# Patient Record
Sex: FEMALE | Race: ASIAN | NOT HISPANIC OR LATINO | Employment: OTHER | URBAN - METROPOLITAN AREA
[De-identification: names, ages, dates, MRNs, and addresses within clinical notes are randomized per-mention and may not be internally consistent; named-entity substitution may affect disease eponyms.]

---

## 2022-08-17 ENCOUNTER — TELEPHONE (OUTPATIENT)
Dept: SCHEDULING | Facility: IMAGING CENTER | Age: 72
End: 2022-08-17

## 2022-08-17 ENCOUNTER — TELEMEDICINE (OUTPATIENT)
Dept: TELEHEALTH | Facility: TELEMEDICINE | Age: 72
End: 2022-08-17
Payer: MEDICARE

## 2022-08-17 VITALS — WEIGHT: 140 LBS | HEIGHT: 63 IN | BODY MASS INDEX: 24.8 KG/M2

## 2022-08-17 DIAGNOSIS — U07.1 COVID: ICD-10-CM

## 2022-08-17 PROCEDURE — 99203 OFFICE O/P NEW LOW 30 MIN: CPT | Mod: 95 | Performed by: NURSE PRACTITIONER

## 2022-08-17 ASSESSMENT — ENCOUNTER SYMPTOMS
NEUROLOGICAL NEGATIVE: 1
GASTROINTESTINAL NEGATIVE: 1
CARDIOVASCULAR NEGATIVE: 1
WHEEZING: 0
SORE THROAT: 1
MUSCULOSKELETAL NEGATIVE: 1
FEVER: 1
SHORTNESS OF BREATH: 0
COUGH: 1

## 2022-08-17 NOTE — PROGRESS NOTES
Edwina Khalil is a 72 y.o. female who presents with Coronavirus Screening (Tested positive last night. 72 yrs old with hypertension and asthma. )           This evaluation was conducted via Zoom using secure and encrypted videoconferencing technology. The patient was in their home in the state [facility].    The patient's identity was confirmed and verbal consent was obtained for this virtual visit.     HPI  Daughter present with patient during telemedicine visit.  States mother tested COVID-positive last night with at home test.  States symptoms started yesterday: Cough fever sore throat.  Eating and drinking well.  History of hypertension, dyslipidemia, asthma.  Does not have current primary care provider but does have new appointment in October to establish with 1.  Taking over-the-counter Tylenol Robitussin-DM and Mucinex lozenges.  Daughter states no allergies to medications.  Does take losartan 25 mg, baby aspirin 81 mg and albuterol as needed.  She also takes a statin for her dyslipidemia.  Daughter states no abnormal kidney function.  Daughter is COVID-positive as well and is currently taking Paxil over the.  Daughter would like to start Paxilovid would with her mother.  States will withhold statin with Paxlovid use.     PMH:  has no past medical history on file.  MEDS:   Current Outpatient Medications:     Nirmatrelvir&Ritonavir 300/100 20 x 150 MG & 10 x 100MG Tablet Therapy Pack, Take 300 mg nirmatrelvir (two 150 mg tablets) with 100 mg ritonavir (one 100 mg tablet) by mouth, with all three tablets taken together twice daily for 5 days., Disp: 30 Each, Rfl: 0  ALLERGIES: Not on File  SURGHX: No past surgical history on file.  SOCHX:    FH: Family history was reviewed, no pertinent findings to report    Review of Systems   Constitutional:  Positive for fever and malaise/fatigue.   HENT:  Positive for sore throat.    Respiratory:  Positive for cough. Negative for shortness of breath and wheezing.   "  Cardiovascular: Negative.    Gastrointestinal: Negative.    Musculoskeletal: Negative.    Neurological: Negative.    All other systems reviewed and are negative.           Objective     Ht 1.6 m (5' 3\")   Wt 63.5 kg (140 lb)   BMI 24.80 kg/m²      Physical Exam  Constitutional:       General: She is awake. She is not in acute distress.     Appearance: Normal appearance. She is not ill-appearing, toxic-appearing or diaphoretic.   Pulmonary:      Effort: Pulmonary effort is normal.   Neurological:      Mental Status: She is alert.   Psychiatric:         Attention and Perception: Attention normal.         Mood and Affect: Mood normal.         Behavior: Behavior normal. Behavior is cooperative.                           Assessment & Plan        1. COVID    - Nirmatrelvir&Ritonavir 300/100 20 x 150 MG & 10 x 100MG Tablet Therapy Pack; Take 300 mg nirmatrelvir (two 150 mg tablets) with 100 mg  ritonavir (one 100 mg tablet) by mouth, with all three tablets taken together  twice daily for 5 days.  Dispense: 30 Each; Refill: 0  -Patient to withhold statin use while on antiviral as well as 5 additional days, daughter is aware of this  -Stay home isolated from others until COVID test resulted the follow CDC guidelines for positive cases as discussed  -Increase water intake  -May use over the counter Tylenol/Ibuprofen as needed for fever or body aches  -Get rest  -Salt water gargle as needed for any sore throat  -May use over the counter Flonase, saline nasal spray as needed for any nasal congestion  -May use over the counter cough suppressant medications like plain Robitussin/Delsym as needed   -May take over the counter Imodium as needed for diarrhea  -Monitor for fevers, cough, shortness of breath, chest pain, chest tightness, lethargy- need re-evaluation in clinic                  "

## 2022-10-10 ENCOUNTER — OFFICE VISIT (OUTPATIENT)
Dept: URGENT CARE | Facility: CLINIC | Age: 72
End: 2022-10-10
Payer: MEDICARE

## 2022-10-10 ENCOUNTER — APPOINTMENT (OUTPATIENT)
Dept: RADIOLOGY | Facility: IMAGING CENTER | Age: 72
End: 2022-10-10
Attending: PHYSICIAN ASSISTANT
Payer: MEDICARE

## 2022-10-10 VITALS
TEMPERATURE: 98.7 F | SYSTOLIC BLOOD PRESSURE: 118 MMHG | BODY MASS INDEX: 25.76 KG/M2 | RESPIRATION RATE: 16 BRPM | HEART RATE: 84 BPM | WEIGHT: 140 LBS | OXYGEN SATURATION: 98 % | HEIGHT: 62 IN | DIASTOLIC BLOOD PRESSURE: 72 MMHG
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
## 2022-10-10 DIAGNOSIS — M25.532 ACUTE PAIN OF LEFT WRIST: ICD-10-CM

## 2022-10-10 PROCEDURE — 73110 X-RAY EXAM OF WRIST: CPT | Mod: TC,FY,LT | Performed by: PHYSICIAN ASSISTANT

## 2022-10-10 PROCEDURE — 99203 OFFICE O/P NEW LOW 30 MIN: CPT | Performed by: PHYSICIAN ASSISTANT

## 2022-10-10 NOTE — PROGRESS NOTES
"Subjective:   Isha Kahlil is a 72 y.o. female who presents for Wrist Injury (Yesterday, left wrist pain after fall, swollen and bruised )     This is a pleasant 72-year-old female accompanied by her daughter who is translating for her today.  She reports a trip and fall on a stair yesterday whereupon when she landed on her left hand.  She is complaining of left wrist and hand pain.  She has tried some ice.  She denies numbness or tingling.  She reports painful range of motion particularly on the ulnar side.  Does have a history of osteopenia.  No prior injury to the left wrist however.        Medications:  Nirmatrelvir&Ritonavir 300/100 Tbpk    Allergies:             Patient has no known allergies.    Surgical History:       No past surgical history on file.    Past Social Hx:  Isha Khalil  reports that she has never smoked. She has never used smokeless tobacco. She reports that she does not drink alcohol.     Past Family Hx:   Isha Khalil family history is not on file.       Problem list, medications, and allergies reviewed by myself today in Epic.     Objective:     /72   Pulse 84   Temp 37.1 °C (98.7 °F) (Temporal)   Resp 16   Ht 1.575 m (5' 2\")   Wt 63.5 kg (140 lb)   SpO2 98%   BMI 25.61 kg/m²     Physical Exam  Musculoskeletal:        Hands:       Comments: There is tenderness to palpation to the distal radius, but greater to the ulnar region and base of the triquetrum and pisiform as well as the ulnar styloid process.  There is decreased range of motion and pain with ulnar and radial deviation.  Neurovascularly intact.  Decreased wrist flexion extension.  Sensation intact.       RADIOLOGY RESULTS   DX-HAND 3+ LEFT    Result Date: 10/10/2022  10/10/2022 8:48 AM HISTORY/REASON FOR EXAM:  Pain/Deformity Following Trauma. TECHNIQUE/EXAM DESCRIPTION AND NUMBER OF VIEWS:  3 views of the LEFT hand. COMPARISON: None FINDINGS: There is no fracture or dislocation. The visualized osseous structures are in anatomic " alignment. Scattered degenerative changes, moderate involving the fifth DIP joint, more mild elsewhere. Bone mineralization is decreased..     1.  No acute osseous abnormality. 2.  Mild to moderate degenerative changes.    DX-WRIST-COMPLETE 3+ LEFT    Result Date: 10/10/2022  10/10/2022 8:48 AM HISTORY/REASON FOR EXAM:  Pain/Deformity Following Trauma. Pain after fall TECHNIQUE/EXAM DESCRIPTION AND NUMBER OF VIEWS:  4 views of the LEFT wrist. COMPARISON: None FINDINGS: There is no fracture or dislocation. The visualized osseous structures are in anatomic alignment. There are degenerative changes of the first carpometacarpal joint. Bone mineralization is decreased.. Soft tissue swelling about the wrist.     1.  Soft tissue swelling without acute osseous abnormality. 2.  Mild-to-moderate degenerative changes of the first carpometacarpal joint.         *X-rays were reviewed and interpreted independently by me. I agree with the radiologist's findings       Assessment/Plan:     Diagnosis and Associated Orders:     1. Acute pain of left wrist  - DX-WRIST-COMPLETE 3+ LEFT; Future  - DX-HAND 3+ LEFT; Future  - Referral to Orthopedics    2. Fall from ground level  - Referral to Orthopedics      Comments/MDM:    No acute radiographic bony abnormality.  Extensive osteoarthritic changes.  Soft tissue swelling present.  Will place in wrist immobilizer and recommend follow-up with orthopedics in 7 to 10 days time for reevaluation and possible repeat imaging.  Recommend ice elevation and OTC analgesics as needed.  Neurovascular intact.  Return precautions discussed.  All questions answered.    I personally reviewed prior external notes and test results pertinent to today's visit.  Red flags discussed as well as indications to present to the Emergency Department.  Supportive care, natural history, differential diagnoses, and indications for immediate follow-up discussed.  Patient expresses understanding and agrees to plan.  Patient  denies any other questions or concerns.    Follow-up with the primary care physician for recheck, reevaluation, and consideration of further management.      Please note that this dictation was created using voice recognition software. I have made a reasonable attempt to correct obvious errors, but I expect that there are errors of grammar and possibly content that I did not discover before finalizing the note.    This note was electronically signed by Faiza Wharton PA-C

## 2022-10-14 ENCOUNTER — OFFICE VISIT (OUTPATIENT)
Dept: MEDICAL GROUP | Facility: MEDICAL CENTER | Age: 72
End: 2022-10-14
Payer: MEDICARE

## 2022-10-14 VITALS
BODY MASS INDEX: 25.15 KG/M2 | WEIGHT: 136.69 LBS | SYSTOLIC BLOOD PRESSURE: 120 MMHG | OXYGEN SATURATION: 98 % | TEMPERATURE: 97.9 F | RESPIRATION RATE: 14 BRPM | HEIGHT: 62 IN | DIASTOLIC BLOOD PRESSURE: 74 MMHG | HEART RATE: 88 BPM

## 2022-10-14 DIAGNOSIS — Z86.711 HISTORY OF PULMONARY EMBOLUS (PE): ICD-10-CM
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
## 2022-10-14 DIAGNOSIS — I10 BENIGN ESSENTIAL HTN: ICD-10-CM

## 2022-10-14 DIAGNOSIS — Z11.59 NEED FOR HEPATITIS C SCREENING TEST: ICD-10-CM

## 2022-10-14 DIAGNOSIS — E78.5 DYSLIPIDEMIA: ICD-10-CM

## 2022-10-14 DIAGNOSIS — Z87.19 HISTORY OF GI BLEED: ICD-10-CM

## 2022-10-14 DIAGNOSIS — Z86.69 HISTORY OF GLAUCOMA: ICD-10-CM

## 2022-10-14 DIAGNOSIS — H54.8 LEGAL BLINDNESS OF RIGHT EYE, AS DEFINED IN UNITED STATES OF AMERICA: ICD-10-CM

## 2022-10-14 DIAGNOSIS — H18.9 CORNEAL ABNORMALITY: ICD-10-CM

## 2022-10-14 DIAGNOSIS — J45.40 MODERATE PERSISTENT ASTHMA WITHOUT COMPLICATION: ICD-10-CM

## 2022-10-14 DIAGNOSIS — E04.2 MULTINODULAR GOITER: ICD-10-CM

## 2022-10-14 DIAGNOSIS — E03.8 HYPOTHYROIDISM DUE TO HASHIMOTO'S THYROIDITIS: ICD-10-CM

## 2022-10-14 DIAGNOSIS — H25.89 OTHER AGE-RELATED CATARACT OF LEFT EYE: ICD-10-CM

## 2022-10-14 DIAGNOSIS — M81.0 AGE-RELATED OSTEOPOROSIS WITHOUT CURRENT PATHOLOGICAL FRACTURE: ICD-10-CM

## 2022-10-14 DIAGNOSIS — E06.3 HYPOTHYROIDISM DUE TO HASHIMOTO'S THYROIDITIS: ICD-10-CM

## 2022-10-14 DIAGNOSIS — G47.09 OTHER INSOMNIA: ICD-10-CM

## 2022-10-14 PROBLEM — E03.9 ACQUIRED HYPOTHYROIDISM: Status: ACTIVE | Noted: 2022-10-14

## 2022-10-14 PROBLEM — E04.1 THYROID NODULE: Status: ACTIVE | Noted: 2022-10-14

## 2022-10-14 PROCEDURE — 99214 OFFICE O/P EST MOD 30 MIN: CPT | Performed by: INTERNAL MEDICINE

## 2022-10-14 RX ORDER — FLUTICASONE PROPIONATE AND SALMETEROL 100; 50 UG/1; UG/1
1 POWDER RESPIRATORY (INHALATION) EVERY 12 HOURS
Qty: 180 EACH | Refills: 3 | Status: SHIPPED | OUTPATIENT
Start: 2022-10-14 | End: 2022-11-01

## 2022-10-14 RX ORDER — LOSARTAN POTASSIUM 25 MG/1
25 TABLET ORAL NIGHTLY
COMMUNITY
End: 2022-10-14 | Stop reason: SDUPTHER

## 2022-10-14 RX ORDER — LEVOTHYROXINE SODIUM 0.05 MG/1
50 TABLET ORAL
COMMUNITY
End: 2022-10-14 | Stop reason: SDUPTHER

## 2022-10-14 RX ORDER — ALPRAZOLAM 0.25 MG/1
0.25 TABLET ORAL NIGHTLY PRN
Qty: 60 TABLET | Refills: 0 | Status: SHIPPED | OUTPATIENT
Start: 2022-10-14 | End: 2022-12-13

## 2022-10-14 RX ORDER — AMLODIPINE BESYLATE 10 MG/1
10 TABLET ORAL DAILY
Qty: 90 TABLET | Refills: 3 | Status: SHIPPED | OUTPATIENT
Start: 2022-10-14 | End: 2024-03-13

## 2022-10-14 RX ORDER — LOSARTAN POTASSIUM 25 MG/1
25 TABLET ORAL NIGHTLY
Qty: 90 TABLET | Refills: 3 | Status: SHIPPED | OUTPATIENT
Start: 2022-10-14 | End: 2023-10-17

## 2022-10-14 RX ORDER — LEVOTHYROXINE SODIUM 0.05 MG/1
50 TABLET ORAL
Qty: 90 TABLET | Refills: 3 | Status: SHIPPED | OUTPATIENT
Start: 2022-10-14 | End: 2023-10-17

## 2022-10-14 RX ORDER — ROSUVASTATIN CALCIUM 5 MG/1
5 TABLET, COATED ORAL EVERY EVENING
COMMUNITY
End: 2022-10-14 | Stop reason: SDUPTHER

## 2022-10-14 RX ORDER — ROSUVASTATIN CALCIUM 5 MG/1
5 TABLET, COATED ORAL EVERY EVENING
Qty: 90 TABLET | Refills: 3 | Status: SHIPPED | OUTPATIENT
Start: 2022-10-14 | End: 2023-10-24

## 2022-10-14 RX ORDER — ASPIRIN 81 MG/1
81 TABLET ORAL DAILY
COMMUNITY

## 2022-10-14 ASSESSMENT — PATIENT HEALTH QUESTIONNAIRE - PHQ9: CLINICAL INTERPRETATION OF PHQ2 SCORE: 0

## 2022-10-14 NOTE — PROGRESS NOTES
New Patient to Establish      CC: Establish, review of chronic medical problems    HPI:   Isha is a 72 y.o. female who came into clinic for above.    She does not have any acute complaint.  She is from [facility].  She currently lives with her daughter who is a primary care physician in [place].    She did fell recently, tripped over something, and sustained a sprain on her left hand.  Initial x-ray did not show fracture.    Her medical problems are stable and well-controlled.  She does need follow-up and new blood tests.      ROS:   As above    Patient Active Problem List    Diagnosis Date Noted    Dyslipidemia 10/14/2022    Hypothyroidism due to Hashimoto's thyroiditis 10/14/2022    Benign essential HTN 10/14/2022    Moderate persistent asthma without complication 10/14/2022    History of pulmonary embolus (PE) 10/14/2022    Other insomnia 10/14/2022    Multinodular goiter sp hemithyroidectomy 10/14/2022    Age-related osteoporosis without current pathological fracture 10/14/2022    History of GI bleed from erosion 10/14/2022    Corneal abnormality, left 10/14/2022    Legal blindness of right eye, as defined in [place] 10/14/2022    History of glaucoma, right 10/14/2022    Other age-related cataract, left 10/14/2022       History reviewed. No pertinent past medical history.    Current Outpatient Medications   Medication Sig Dispense Refill    aspirin 81 MG EC tablet Take 81 mg by mouth every day.      amLODIPine (NORVASC) 10 MG Tab Take 1 Tablet by mouth every day. 90 Tablet 3    fluticasone-salmeterol (ADVAIR DISKUS) 100-50 MCG/ACT AEROSOL POWDER, BREATH ACTIVATED Inhale 1 Puff every 12 hours. 180 Each 3    Metoprolol Succinate 25 MG Capsule ER 24 Hour Sprinkle Take 25 mg by mouth every day. 90 Capsule 3    ALPRAZolam (XANAX) 0.25 MG Tab Take 1 Tablet by mouth at bedtime as needed for Sleep for up to 60 days. 60 Tablet 0    rosuvastatin (CRESTOR) 5 MG Tab Take 1 Tablet by mouth every evening. 90 Tablet 3  "   losartan (COZAAR) 25 MG Tab Take 1 Tablet by mouth every evening. 90 Tablet 3    levothyroxine (SYNTHROID) 50 MCG Tab Take 1 Tablet by mouth every morning on an empty stomach. 90 Tablet 3     No current facility-administered medications for this visit.       Allergies as of 10/14/2022    (No Known Allergies)       Social History     Socioeconomic History    Marital status:      Spouse name: Not on file    Number of children: Not on file    Years of education: Not on file    Highest education level: Not on file   Occupational History    Not on file   Tobacco Use    Smoking status: Never    Smokeless tobacco: Never   Vaping Use    Vaping Use: Never used   Substance and Sexual Activity    Alcohol use: Never    Drug use: Never    Sexual activity: Not on file     Comment: , 4 kids   Other Topics Concern    Not on file   Social History Narrative    Not on file     Social Determinants of Health     Financial Resource Strain: Not on file   Food Insecurity: Not on file   Transportation Needs: Not on file   Physical Activity: Not on file   Stress: Not on file   Social Connections: Not on file   Intimate Partner Violence: Not on file   Housing Stability: Not on file       Family History   Problem Relation Age of Onset    No Known Problems Mother     No Known Problems Father        Past Surgical History:   Procedure Laterality Date    OTHER  2016    hemithyroidectomy, left    APPENDECTOMY  2008    OTHER      anal fissure repair         /74 (BP Location: Left arm, Patient Position: Sitting, BP Cuff Size: Small adult)   Pulse 88   Temp 36.6 °C (97.9 °F) (Temporal)   Resp 14   Ht 1.575 m (5' 2\")   Wt 62 kg (136 lb 11 oz)   SpO2 98%   BMI 25.00 kg/m²     Physical Exam  General: Alert and oriented, No apparent distress.  No scleral icterus.  Neck: Supple. No cervical or supraclavicular lymphadenopathy noted. Thyroid not enlarged.  Lungs: Clear to auscultation bilaterally without any wheezing, " crepitations.  Cardiovascular: Regular rate and rhythm. No murmurs, rubs or gallops.  Abdomen: Bowel sound +, soft, non tender, no rebound or guarding, no palpable organomegaly  Extremities: No clubbing, cyanosis, edema.      Assessment and Plan    1. Benign essential HTN  Controlled with metoprolol XR 12.5 mg daily and cilnidipine 10 mg daily. Cilnidipine is not liver in [place], usual dose from 5 to 10 mg, maximal dose 20 mg.  Switched to amlodipine 10 mg daily.  Monitor blood pressure at home.  We do not have metoprolol  12.5 mg XR tablets either.  Consider increasing metoprolol to 25 mg daily and cutting down amlodipine to 5 mg daily depending on her blood pressure.  - amLODIPine (NORVASC) 10 MG Tab; Take 1 Tablet by mouth every day.  Dispense: 90 Tablet; Refill: 3  - Metoprolol Succinate 25 MG Capsule ER 24 Hour Sprinkle; Take 25 mg by mouth every day.  Dispense: 90 Capsule; Refill: 3  - losartan (COZAAR) 25 MG Tab; Take 1 Tablet by mouth every evening.  Dispense: 90 Tablet; Refill: 3  - PROTEIN/CREAT RATIO URINE; Future  - MICROALBUMIN CREAT RATIO URINE; Future    2. Dyslipidemia  Tolerating statin.  - rosuvastatin (CRESTOR) 5 MG Tab; Take 1 Tablet by mouth every evening.  Dispense: 90 Tablet; Refill: 3  - Comp Metabolic Panel; Future  - Lipid Profile; Future    3. Hypothyroidism due to Hashimoto's thyroiditis  Initially hyper, later became hypo  - levothyroxine (SYNTHROID) 50 MCG Tab; Take 1 Tablet by mouth every morning on an empty stomach.  Dispense: 90 Tablet; Refill: 3  - TSH WITH REFLEX TO FT4; Future    4. Multinodular goiter  Left hemithyroidectomy due to suspicious nodule in 2016. No f/up US sinc.e  - US-THYROID; Future    5. Moderate persistent asthma without complication  Stable.  - fluticasone-salmeterol (ADVAIR DISKUS) 100-50 MCG/ACT AEROSOL POWDER, BREATH ACTIVATED; Inhale 1 Puff every 12 hours.  Dispense: 180 Each; Refill: 3    6. History of pulmonary embolus (PE)  7. History of GI bleed  from erosion  Had a few UGI bleed from anticoagulation when she got PE. Provoked after airplane trip in 2020. Currently no recurrence. On aspirin for primary prophylaxis  - CBC WITH DIFFERENTIAL; Future    8. Other insomnia  Xanax 2-3 times per week for insomnia. Discussed the risks of long term use. Try to minimize use.  - ALPRAZolam (XANAX) 0.25 MG Tab; Take 1 Tablet by mouth at bedtime as needed for Sleep for up to 60 days.  Dispense: 60 Tablet; Refill: 0    9. Age-related osteoporosis without current pathological fracture  She was on bisphosphonate x 4 yrs, on holiday for a few yrs now.  - DS-BONE DENSITY STUDY (DEXA); Future  - VITAMIN D,25 HYDROXY (DEFICIENCY); Future    10. Corneal abnormality, left  - hypersensitivity of cornea with tearing, better with non prescription soft contact lens. Changes once a month.    11. Legal blindness of right eye, as defined in [place]    12. Other age-related cataract of left eye  - Referral to Ophthalmology    13. History of glaucoma  - Referral to Ophthalmology    14. Need for hepatitis C screening test  - HEP C VIRUS ANTIBODY; Future    15. Fall from ground level  - VITAMIN B12; Future           Followup: based on results           Signed by: Malena Khalil M.D.

## 2022-10-14 NOTE — LETTER
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

## 2022-10-22 ENCOUNTER — HOSPITAL ENCOUNTER (OUTPATIENT)
Dept: LAB | Facility: MEDICAL CENTER | Age: 72
End: 2022-10-22
Attending: INTERNAL MEDICINE
Payer: MEDICARE

## 2022-10-22 DIAGNOSIS — Z11.59 NEED FOR HEPATITIS C SCREENING TEST: ICD-10-CM

## 2022-10-22 DIAGNOSIS — E03.8 HYPOTHYROIDISM DUE TO HASHIMOTO'S THYROIDITIS: ICD-10-CM

## 2022-10-22 DIAGNOSIS — E78.5 DYSLIPIDEMIA: ICD-10-CM

## 2022-10-22 DIAGNOSIS — I10 BENIGN ESSENTIAL HTN: ICD-10-CM

## 2022-10-22 DIAGNOSIS — E06.3 HYPOTHYROIDISM DUE TO HASHIMOTO'S THYROIDITIS: ICD-10-CM
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
## 2022-10-22 DIAGNOSIS — Z87.19 HISTORY OF GI BLEED: ICD-10-CM

## 2022-10-22 DIAGNOSIS — M81.0 AGE-RELATED OSTEOPOROSIS WITHOUT CURRENT PATHOLOGICAL FRACTURE: ICD-10-CM

## 2022-10-22 LAB
25(OH)D3 SERPL-MCNC: 9 NG/ML (ref 30–100)
ALBUMIN SERPL BCP-MCNC: 4.4 G/DL (ref 3.2–4.9)
ALBUMIN/GLOB SERPL: 1.7 G/DL
ALP SERPL-CCNC: 93 U/L (ref 30–99)
ALT SERPL-CCNC: 12 U/L (ref 2–50)
ANION GAP SERPL CALC-SCNC: 9 MMOL/L (ref 7–16)
AST SERPL-CCNC: 18 U/L (ref 12–45)
BASOPHILS # BLD AUTO: 0.7 % (ref 0–1.8)
BASOPHILS # BLD: 0.03 K/UL (ref 0–0.12)
BILIRUB SERPL-MCNC: 0.7 MG/DL (ref 0.1–1.5)
BUN SERPL-MCNC: 6 MG/DL (ref 8–22)
CALCIUM SERPL-MCNC: 8.9 MG/DL (ref 8.5–10.5)
CHLORIDE SERPL-SCNC: 104 MMOL/L (ref 96–112)
CHOLEST SERPL-MCNC: 142 MG/DL (ref 100–199)
CO2 SERPL-SCNC: 26 MMOL/L (ref 20–33)
CREAT SERPL-MCNC: 0.58 MG/DL (ref 0.5–1.4)
CREAT UR-MCNC: 134.78 MG/DL
CREAT UR-MCNC: 136.27 MG/DL
EOSINOPHIL # BLD AUTO: 0.04 K/UL (ref 0–0.51)
EOSINOPHIL NFR BLD: 0.9 % (ref 0–6.9)
ERYTHROCYTE [DISTWIDTH] IN BLOOD BY AUTOMATED COUNT: 47.3 FL (ref 35.9–50)
FASTING STATUS PATIENT QL REPORTED: NORMAL
GFR SERPLBLD CREATININE-BSD FMLA CKD-EPI: 95 ML/MIN/1.73 M 2
GLOBULIN SER CALC-MCNC: 2.6 G/DL (ref 1.9–3.5)
GLUCOSE SERPL-MCNC: 101 MG/DL (ref 65–99)
HCT VFR BLD AUTO: 39.5 % (ref 37–47)
HCV AB SER QL: NORMAL
HDLC SERPL-MCNC: 57 MG/DL
HGB BLD-MCNC: 13 G/DL (ref 12–16)
IMM GRANULOCYTES # BLD AUTO: 0.02 K/UL (ref 0–0.11)
IMM GRANULOCYTES NFR BLD AUTO: 0.5 % (ref 0–0.9)
LDLC SERPL CALC-MCNC: 67 MG/DL
LYMPHOCYTES # BLD AUTO: 1.14 K/UL (ref 1–4.8)
LYMPHOCYTES NFR BLD: 25.7 % (ref 22–41)
MCH RBC QN AUTO: 30.1 PG (ref 27–33)
MCHC RBC AUTO-ENTMCNC: 32.9 G/DL (ref 33.6–35)
MCV RBC AUTO: 91.4 FL (ref 81.4–97.8)
MICROALBUMIN UR-MCNC: <1.2 MG/DL
MICROALBUMIN/CREAT UR: NORMAL MG/G (ref 0–30)
MONOCYTES # BLD AUTO: 0.41 K/UL (ref 0–0.85)
MONOCYTES NFR BLD AUTO: 9.2 % (ref 0–13.4)
NEUTROPHILS # BLD AUTO: 2.8 K/UL (ref 2–7.15)
NEUTROPHILS NFR BLD: 63 % (ref 44–72)
NRBC # BLD AUTO: 0 K/UL
NRBC BLD-RTO: 0 /100 WBC
PLATELET # BLD AUTO: 275 K/UL (ref 164–446)
PMV BLD AUTO: 9.8 FL (ref 9–12.9)
POTASSIUM SERPL-SCNC: 4 MMOL/L (ref 3.6–5.5)
PROT SERPL-MCNC: 7 G/DL (ref 6–8.2)
PROT UR-MCNC: 12 MG/DL (ref 0–15)
PROT/CREAT UR: 89 MG/G (ref 10–107)
RBC # BLD AUTO: 4.32 M/UL (ref 4.2–5.4)
SODIUM SERPL-SCNC: 139 MMOL/L (ref 135–145)
TRIGL SERPL-MCNC: 88 MG/DL (ref 0–149)
TSH SERPL DL<=0.005 MIU/L-ACNC: 4.39 UIU/ML (ref 0.38–5.33)
VIT B12 SERPL-MCNC: 407 PG/ML (ref 211–911)
WBC # BLD AUTO: 4.4 K/UL (ref 4.8–10.8)

## 2022-10-22 PROCEDURE — 82306 VITAMIN D 25 HYDROXY: CPT

## 2022-10-22 PROCEDURE — 82607 VITAMIN B-12: CPT

## 2022-10-22 PROCEDURE — 82570 ASSAY OF URINE CREATININE: CPT

## 2022-10-22 PROCEDURE — 82043 UR ALBUMIN QUANTITATIVE: CPT

## 2022-10-22 PROCEDURE — 84156 ASSAY OF PROTEIN URINE: CPT

## 2022-10-22 PROCEDURE — 80053 COMPREHEN METABOLIC PANEL: CPT

## 2022-10-22 PROCEDURE — 86803 HEPATITIS C AB TEST: CPT

## 2022-10-22 PROCEDURE — 85025 COMPLETE CBC W/AUTO DIFF WBC: CPT

## 2022-10-22 PROCEDURE — 80061 LIPID PANEL: CPT

## 2022-10-22 PROCEDURE — 36415 COLL VENOUS BLD VENIPUNCTURE: CPT

## 2022-10-22 PROCEDURE — 84443 ASSAY THYROID STIM HORMONE: CPT

## 2022-10-25 DIAGNOSIS — R79.89 LOW VITAMIN D LEVEL: ICD-10-CM

## 2022-10-25 DIAGNOSIS — M25.532 LEFT WRIST PAIN: ICD-10-CM

## 2022-10-25 DIAGNOSIS — I10 BENIGN ESSENTIAL HTN: ICD-10-CM

## 2022-10-25 RX ORDER — ERGOCALCIFEROL 1.25 MG/1
50000 CAPSULE ORAL
Qty: 12 CAPSULE | Refills: 0 | Status: SHIPPED | OUTPATIENT
Start: 2022-10-25 | End: 2023-02-09

## 2022-11-01 DIAGNOSIS — J45.40 MODERATE PERSISTENT ASTHMA WITHOUT COMPLICATION: ICD-10-CM

## 2022-11-04 ENCOUNTER — PATIENT MESSAGE (OUTPATIENT)
Dept: MEDICAL GROUP | Facility: MEDICAL CENTER | Age: 72
End: 2022-11-04
Payer: MEDICARE

## 2022-11-04 DIAGNOSIS — I10 BENIGN ESSENTIAL HTN: ICD-10-CM

## 2022-11-04 RX ORDER — METOPROLOL SUCCINATE 25 MG/1
25 TABLET, EXTENDED RELEASE ORAL DAILY
Qty: 90 TABLET | Refills: 3 | Status: SHIPPED | OUTPATIENT
Start: 2022-11-04 | End: 2023-12-27

## 2022-11-11 ENCOUNTER — PATIENT MESSAGE (OUTPATIENT)
Dept: HEALTH INFORMATION MANAGEMENT | Facility: OTHER | Age: 72
End: 2022-11-11

## 2022-11-11 ENCOUNTER — HOSPITAL ENCOUNTER (OUTPATIENT)
Dept: RADIOLOGY | Facility: MEDICAL CENTER | Age: 72
End: 2022-11-11
Attending: INTERNAL MEDICINE
Payer: MEDICARE

## 2022-11-11 DIAGNOSIS — M81.0 AGE-RELATED OSTEOPOROSIS WITHOUT CURRENT PATHOLOGICAL FRACTURE: ICD-10-CM

## 2022-11-11 PROCEDURE — 77080 DXA BONE DENSITY AXIAL: CPT

## 2022-11-16 RX ORDER — EPINEPHRINE 1 MG/ML(1)
0.5 AMPUL (ML) INJECTION PRN
Status: CANCELLED | OUTPATIENT
Start: 2022-11-16

## 2022-11-16 RX ORDER — METHYLPREDNISOLONE SODIUM SUCCINATE 125 MG/2ML
125 INJECTION, POWDER, LYOPHILIZED, FOR SOLUTION INTRAMUSCULAR; INTRAVENOUS PRN
Status: CANCELLED | OUTPATIENT
Start: 2022-11-16

## 2022-11-16 RX ORDER — DIPHENHYDRAMINE HYDROCHLORIDE 50 MG/ML
50 INJECTION INTRAMUSCULAR; INTRAVENOUS PRN
Status: CANCELLED | OUTPATIENT
Start: 2022-11-16

## 2022-11-20 ENCOUNTER — HOSPITAL ENCOUNTER (OUTPATIENT)
Dept: RADIOLOGY | Facility: MEDICAL CENTER | Age: 72
End: 2022-11-20
Attending: INTERNAL MEDICINE
Payer: MEDICARE

## 2022-11-20 DIAGNOSIS — E04.2 MULTINODULAR GOITER: ICD-10-CM

## 2022-11-20 PROCEDURE — 76536 US EXAM OF HEAD AND NECK: CPT

## 2022-12-10 ENCOUNTER — OUTPATIENT INFUSION SERVICES (OUTPATIENT)
Dept: ONCOLOGY | Facility: MEDICAL CENTER | Age: 72
End: 2022-12-10
Attending: INTERNAL MEDICINE
Payer: MEDICARE

## 2022-12-10 VITALS
RESPIRATION RATE: 18 BRPM | BODY MASS INDEX: 27.79 KG/M2 | HEIGHT: 60 IN | TEMPERATURE: 97.5 F | SYSTOLIC BLOOD PRESSURE: 134 MMHG | DIASTOLIC BLOOD PRESSURE: 74 MMHG | WEIGHT: 141.54 LBS | OXYGEN SATURATION: 97 % | HEART RATE: 106 BPM

## 2022-12-10 DIAGNOSIS — M81.0 AGE-RELATED OSTEOPOROSIS WITHOUT CURRENT PATHOLOGICAL FRACTURE: ICD-10-CM

## 2022-12-10 LAB
CA-I BLD ISE-SCNC: 1.18 MMOL/L (ref 1.1–1.3)
CREAT BLD-MCNC: 0.6 MG/DL (ref 0.5–1.4)

## 2022-12-10 PROCEDURE — 36415 COLL VENOUS BLD VENIPUNCTURE: CPT

## 2022-12-10 PROCEDURE — 700111 HCHG RX REV CODE 636 W/ 250 OVERRIDE (IP): Mod: JG | Performed by: INTERNAL MEDICINE

## 2022-12-10 PROCEDURE — 96372 THER/PROPH/DIAG INJ SC/IM: CPT

## 2022-12-10 PROCEDURE — 82565 ASSAY OF CREATININE: CPT

## 2022-12-10 PROCEDURE — 82330 ASSAY OF CALCIUM: CPT

## 2022-12-10 RX ORDER — DIPHENHYDRAMINE HYDROCHLORIDE 50 MG/ML
50 INJECTION INTRAMUSCULAR; INTRAVENOUS PRN
Status: CANCELLED | OUTPATIENT
Start: 2023-06-08

## 2022-12-10 RX ORDER — EPINEPHRINE 1 MG/ML(1)
0.5 AMPUL (ML) INJECTION PRN
Status: CANCELLED | OUTPATIENT
Start: 2023-06-08

## 2022-12-10 RX ORDER — METHYLPREDNISOLONE SODIUM SUCCINATE 125 MG/2ML
125 INJECTION, POWDER, LYOPHILIZED, FOR SOLUTION INTRAMUSCULAR; INTRAVENOUS PRN
Status: CANCELLED | OUTPATIENT
Start: 2023-06-08

## 2022-12-10 RX ADMIN — DENOSUMAB 60 MG: 60 INJECTION SUBCUTANEOUS at 15:00

## 2022-12-10 ASSESSMENT — FIBROSIS 4 INDEX: FIB4 SCORE: 1.4

## 2022-12-10 NOTE — PROGRESS NOTES
into Infusion Services for a first time Prolia injection for Age-related osteoporosis without current pathological fracture.  Pt denied having any new complaints, acute infections, or dental procedures in the last month or scheduled for the next month. RN reviewed medication handout on Prolia with Pt, including potential side effects and S/S of when to follow-up with MD or ED, Pt acknowledged understanding.25 x 3/4G needle used to draw blood from Right AC, bleeding controlled with gauze and coban after. Ionized calcium/creatinine tested, WNL, pharmacist notified that Pt within parameters to treat.  Isha aware to continue taking vitamin D and calcium supplements. Prolia injection given in Right back of right arm SQ. Pt tolerated well, gauze and paper tape applied to injection site. Observed for 30 min post injection, no s/s of reaction observed or expressed. Patient will call to make future appointment, left department appearing in good spirits and NAD.

## 2023-02-20 ENCOUNTER — OFFICE VISIT (OUTPATIENT)
Dept: URGENT CARE | Facility: CLINIC | Age: 73
End: 2023-02-20
Payer: MEDICARE

## 2023-02-20 VITALS
OXYGEN SATURATION: 98 % | HEART RATE: 90 BPM | BODY MASS INDEX: 26.43 KG/M2 | WEIGHT: 140 LBS | HEIGHT: 61 IN | RESPIRATION RATE: 20 BRPM | SYSTOLIC BLOOD PRESSURE: 136 MMHG | TEMPERATURE: 99.3 F | DIASTOLIC BLOOD PRESSURE: 70 MMHG

## 2023-02-20 DIAGNOSIS — U07.1 COVID: ICD-10-CM

## 2023-02-20 PROCEDURE — 99213 OFFICE O/P EST LOW 20 MIN: CPT | Performed by: FAMILY MEDICINE

## 2023-02-20 RX ORDER — DENOSUMAB 60 MG/ML
INJECTION SUBCUTANEOUS
COMMUNITY
Start: 2022-12-10

## 2023-02-20 ASSESSMENT — ENCOUNTER SYMPTOMS
CARDIOVASCULAR NEGATIVE: 1
CONSTITUTIONAL NEGATIVE: 1
MYALGIAS: 1
COUGH: 1

## 2023-02-20 ASSESSMENT — FIBROSIS 4 INDEX: FIB4 SCORE: 1.4

## 2023-02-20 NOTE — PROGRESS NOTES
"Subjective:   Isha Khalil is a 72 y.o. female who presents for Cough (Fatigued, runny nose, mild fever x 1 day, took a home Covid test today: Positive)  Daughter is a physician, she is pos for covid as well and gave her mom first dose of covid med.    Cough  Associated symptoms include myalgias.     Review of Systems   Constitutional: Negative.    HENT: Negative.     Respiratory:  Positive for cough.    Cardiovascular: Negative.    Musculoskeletal:  Positive for myalgias.   Skin: Negative.      Medications, Allergies, and current problem list reviewed today in Epic.     Objective:     /70 (BP Location: Left arm, Patient Position: Sitting, BP Cuff Size: Adult)   Pulse 90   Temp 37.4 °C (99.3 °F) (Temporal)   Resp 20   Ht 1.549 m (5' 1\")   Wt 63.5 kg (140 lb)   SpO2 98%     Physical Exam  Vitals and nursing note reviewed.   Constitutional:       Appearance: Normal appearance.   HENT:      Head: Normocephalic and atraumatic.      Right Ear: Tympanic membrane normal.      Left Ear: Tympanic membrane normal.      Nose: Nose normal.      Mouth/Throat:      Pharynx: Oropharynx is clear.   Cardiovascular:      Rate and Rhythm: Normal rate and regular rhythm.      Pulses: Normal pulses.      Heart sounds: Normal heart sounds.   Pulmonary:      Effort: Pulmonary effort is normal.      Breath sounds: Normal breath sounds.   Abdominal:      General: Abdomen is flat. Bowel sounds are normal.      Palpations: Abdomen is soft.   Neurological:      Mental Status: She is alert.       Assessment/Plan:     Diagnosis and associated orders:     1. COVID  Nirmatrelvir&Ritonavir 300/100 20 x 150 MG & 10 x 100MG Tablet Therapy Pack         Comments/MDM:              Differential diagnosis, natural history, supportive care, and indications for immediate follow-up discussed.    Advised the patient to follow-up with the primary care physician for recheck, reevaluation, and consideration of further management.    Please note that this " dictation was created using voice recognition software. I have made a reasonable attempt to correct obvious errors, but I expect that there are errors of grammar and possibly content that I did not discover before finalizing the note.    This note was electronically signed by Kushal Montero M.D.

## 2023-02-26 ENCOUNTER — PATIENT MESSAGE (OUTPATIENT)
Dept: MEDICAL GROUP | Facility: MEDICAL CENTER | Age: 73
End: 2023-02-26
Payer: MEDICARE

## 2023-02-26 DIAGNOSIS — R06.02 SOB (SHORTNESS OF BREATH): ICD-10-CM

## 2023-03-21 ENCOUNTER — HOSPITAL ENCOUNTER (OUTPATIENT)
Dept: LAB | Facility: MEDICAL CENTER | Age: 73
End: 2023-03-21
Attending: INTERNAL MEDICINE
Payer: MEDICARE

## 2023-03-21 ENCOUNTER — NON-PROVIDER VISIT (OUTPATIENT)
Dept: MEDICAL GROUP | Facility: MEDICAL CENTER | Age: 73
End: 2023-03-21
Payer: MEDICARE

## 2023-03-21 DIAGNOSIS — R07.89 ATYPICAL CHEST PAIN: ICD-10-CM

## 2023-03-21 LAB — TROPONIN T SERPL-MCNC: 8 NG/L (ref 6–19)

## 2023-03-21 PROCEDURE — 36415 COLL VENOUS BLD VENIPUNCTURE: CPT

## 2023-03-21 PROCEDURE — 84484 ASSAY OF TROPONIN QUANT: CPT

## 2023-03-21 PROCEDURE — 93000 ELECTROCARDIOGRAM COMPLETE: CPT | Performed by: INTERNAL MEDICINE

## 2023-03-22 ENCOUNTER — HOSPITAL ENCOUNTER (OUTPATIENT)
Dept: CARDIOLOGY | Facility: MEDICAL CENTER | Age: 73
End: 2023-03-22
Attending: INTERNAL MEDICINE
Payer: MEDICARE

## 2023-03-22 DIAGNOSIS — R06.02 SOB (SHORTNESS OF BREATH): ICD-10-CM

## 2023-03-22 LAB
LV EJECT FRACT  99904: 70
LV EJECT FRACT MOD 2C 99903: 65.69
LV EJECT FRACT MOD 4C 99902: 69.88
LV EJECT FRACT MOD BP 99901: 70.34

## 2023-03-22 PROCEDURE — 93306 TTE W/DOPPLER COMPLETE: CPT

## 2023-03-22 PROCEDURE — 93306 TTE W/DOPPLER COMPLETE: CPT | Mod: 26 | Performed by: INTERNAL MEDICINE

## 2023-03-29 ENCOUNTER — HOSPITAL ENCOUNTER (OUTPATIENT)
Dept: RADIOLOGY | Facility: MEDICAL CENTER | Age: 73
End: 2023-03-29
Attending: INTERNAL MEDICINE
Payer: MEDICARE

## 2023-03-29 DIAGNOSIS — R07.89 ATYPICAL CHEST PAIN: ICD-10-CM

## 2023-03-29 PROCEDURE — 700111 HCHG RX REV CODE 636 W/ 250 OVERRIDE (IP)

## 2023-03-29 PROCEDURE — 78452 HT MUSCLE IMAGE SPECT MULT: CPT

## 2023-03-29 RX ORDER — AMINOPHYLLINE 25 MG/ML
100 INJECTION, SOLUTION INTRAVENOUS
Status: DISCONTINUED | OUTPATIENT
Start: 2023-03-29 | End: 2023-03-30 | Stop reason: HOSPADM

## 2023-03-29 RX ORDER — REGADENOSON 0.08 MG/ML
INJECTION, SOLUTION INTRAVENOUS
Status: COMPLETED
Start: 2023-03-29 | End: 2023-03-29

## 2023-03-29 RX ORDER — REGADENOSON 0.08 MG/ML
0.4 INJECTION, SOLUTION INTRAVENOUS ONCE
Status: COMPLETED | OUTPATIENT
Start: 2023-03-29 | End: 2023-03-29

## 2023-03-29 RX ADMIN — REGADENOSON 0.4 MG: 0.08 INJECTION, SOLUTION INTRAVENOUS at 12:34

## 2023-04-22 ENCOUNTER — HOSPITAL ENCOUNTER (OUTPATIENT)
Dept: PULMONOLOGY | Facility: MEDICAL CENTER | Age: 73
End: 2023-04-22
Attending: INTERNAL MEDICINE
Payer: MEDICARE

## 2023-04-22 DIAGNOSIS — R06.02 SOB (SHORTNESS OF BREATH): ICD-10-CM

## 2023-04-22 PROCEDURE — 94060 EVALUATION OF WHEEZING: CPT | Mod: 26 | Performed by: INTERNAL MEDICINE

## 2023-04-22 PROCEDURE — 94060 EVALUATION OF WHEEZING: CPT

## 2023-04-22 PROCEDURE — 94726 PLETHYSMOGRAPHY LUNG VOLUMES: CPT | Mod: 26 | Performed by: INTERNAL MEDICINE

## 2023-04-22 PROCEDURE — 94726 PLETHYSMOGRAPHY LUNG VOLUMES: CPT

## 2023-04-22 RX ADMIN — Medication 2.5 MG: at 09:05

## 2023-04-22 ASSESSMENT — PULMONARY FUNCTION TESTS
FEV1/FVC: 83
FEV1/FVC_PERCENT_PREDICTED: 102
FEV1/FVC_PERCENT_LLN: 66
FEV1/FVC_PERCENT_PREDICTED: 102
FEV1/FVC_PERCENT_PREDICTED: 106
FEV1_PERCENT_CHANGE: 0
FVC_PREDICTED: 2.25
FEV1/FVC: 83
FEV1/FVC: 81.52
FEV1/FVC_PERCENT_PREDICTED: 78
FEV1/FVC_PERCENT_CHANGE: -2
FEV1_PERCENT_CHANGE: -2
FEV1/FVC_PERCENT_LLN: 66
FVC: 1.85
FEV1/FVC: 81
FEV1/FVC_PREDICTED: 79
FEV1_PREDICTED: 1.76
FVC_LLN: 1.88
FVC_PERCENT_PREDICTED: 82
FEV1: 1.54
FEV1_PERCENT_PREDICTED: 84
FVC_PERCENT_PREDICTED: 82
FEV1_LLN: 1.47
FEV1_PERCENT_PREDICTED: 87
FEV1: 1.5
FVC: 1.84
FVC_LLN: 1.88
FEV1/FVC_PERCENT_PREDICTED: 105
FEV1_LLN: 1.47

## 2023-04-23 NOTE — PROCEDURES
DATE OF SERVICE:  04/22/2023     PULMONARY FUNCTION TEST INTERPRETATION     REQUESTING PROVIDER:  Malena Khalil MD     REASON FOR REQUEST:  Shortness of breath.     INTERPRETATION:  1.  Acceptable and reproducible.  2.  FEV1 1.54 liters (87%), FVC 1.85 liters (82%), ratio 83%.  3.  Flow volume loops consistent with obstruction.  4.  TLC 4.14 liters (89%).  5.  DLCO, the patient was unable to do.     IMPRESSION:  Normal spirometry with no response to bronchodilator.  Evidence   of mild air trapping of unclear significance.  Normal total lung capacity.    Clinically correlate.        ______________________________  MD KARSON Sousa/SONNY    DD:  04/23/2023 11:58  DT:  04/23/2023 13:17    Job#:  [id number]    CC:Malena Khalil MD

## 2023-06-11 ENCOUNTER — OUTPATIENT INFUSION SERVICES (OUTPATIENT)
Dept: ONCOLOGY | Facility: MEDICAL CENTER | Age: 73
End: 2023-06-11
Attending: INTERNAL MEDICINE
Payer: MEDICARE

## 2023-06-11 VITALS
OXYGEN SATURATION: 97 % | HEART RATE: 76 BPM | DIASTOLIC BLOOD PRESSURE: 78 MMHG | TEMPERATURE: 98 F | SYSTOLIC BLOOD PRESSURE: 137 MMHG | HEIGHT: 60 IN | BODY MASS INDEX: 27.79 KG/M2 | RESPIRATION RATE: 18 BRPM | WEIGHT: 141.54 LBS

## 2023-06-11 DIAGNOSIS — M81.0 AGE-RELATED OSTEOPOROSIS WITHOUT CURRENT PATHOLOGICAL FRACTURE: ICD-10-CM

## 2023-06-11 LAB
CA-I BLD ISE-SCNC: 1.02 MMOL/L (ref 1.1–1.3)
CREAT BLD-MCNC: 0.7 MG/DL (ref 0.5–1.4)

## 2023-06-11 PROCEDURE — 82565 ASSAY OF CREATININE: CPT

## 2023-06-11 PROCEDURE — 36415 COLL VENOUS BLD VENIPUNCTURE: CPT

## 2023-06-11 PROCEDURE — 96372 THER/PROPH/DIAG INJ SC/IM: CPT

## 2023-06-11 PROCEDURE — 700111 HCHG RX REV CODE 636 W/ 250 OVERRIDE (IP): Mod: JG,UD | Performed by: INTERNAL MEDICINE

## 2023-06-11 PROCEDURE — 82330 ASSAY OF CALCIUM: CPT

## 2023-06-11 RX ORDER — EPINEPHRINE 1 MG/ML(1)
0.5 AMPUL (ML) INJECTION PRN
Status: CANCELLED | OUTPATIENT
Start: 2023-12-05

## 2023-06-11 RX ORDER — DIPHENHYDRAMINE HYDROCHLORIDE 50 MG/ML
50 INJECTION INTRAMUSCULAR; INTRAVENOUS PRN
Status: CANCELLED | OUTPATIENT
Start: 2023-12-05

## 2023-06-11 RX ORDER — METHYLPREDNISOLONE SODIUM SUCCINATE 125 MG/2ML
125 INJECTION, POWDER, LYOPHILIZED, FOR SOLUTION INTRAMUSCULAR; INTRAVENOUS PRN
Status: CANCELLED | OUTPATIENT
Start: 2023-12-05

## 2023-06-11 RX ADMIN — DENOSUMAB 60 MG: 60 INJECTION SUBCUTANEOUS at 13:42

## 2023-06-11 ASSESSMENT — FIBROSIS 4 INDEX: FIB4 SCORE: 1.42

## 2023-06-11 NOTE — PROGRESS NOTES
Pt arrived ambulatory to IS for q 6 month Prolia. POC discussed with family member, pt declines translation services. Pt denies active infections or recent/upcoming invasive dental procedures. Labs drawn, results reviewed, pt appropriate for treatment. Prolia given as ordered to back of L arm, site covered with adhesive bandage. Next appointment confirmed. Pt discharged from IS in NAD with family member.

## 2023-08-03 ENCOUNTER — HOSPITAL ENCOUNTER (EMERGENCY)
Facility: MEDICAL CENTER | Age: 73
End: 2023-08-03
Attending: EMERGENCY MEDICINE
Payer: MEDICARE

## 2023-08-03 VITALS
BODY MASS INDEX: 26.43 KG/M2 | OXYGEN SATURATION: 96 % | TEMPERATURE: 97.2 F | RESPIRATION RATE: 18 BRPM | DIASTOLIC BLOOD PRESSURE: 78 MMHG | SYSTOLIC BLOOD PRESSURE: 145 MMHG | WEIGHT: 139.99 LBS | HEIGHT: 61 IN | HEART RATE: 78 BPM

## 2023-08-03 DIAGNOSIS — H53.9 VISUAL CHANGES: ICD-10-CM

## 2023-08-03 PROCEDURE — 99284 EMERGENCY DEPT VISIT MOD MDM: CPT

## 2023-08-03 PROCEDURE — 700101 HCHG RX REV CODE 250: Performed by: EMERGENCY MEDICINE

## 2023-08-03 RX ORDER — PROPARACAINE HYDROCHLORIDE 5 MG/ML
1 SOLUTION/ DROPS OPHTHALMIC ONCE
Status: COMPLETED | OUTPATIENT
Start: 2023-08-03 | End: 2023-08-03

## 2023-08-03 RX ADMIN — PROPARACAINE HYDROCHLORIDE 1 DROP: 5 SOLUTION/ DROPS OPHTHALMIC at 11:30

## 2023-08-03 ASSESSMENT — FIBROSIS 4 INDEX: FIB4 SCORE: 1.42

## 2023-08-03 NOTE — ED TRIAGE NOTES
Isha Khalil  73 y.o.  Chief Complaint   Patient presents with    Blurred Vision     Daughter reports pt is seeing a black floating image in her R eye; Pt is blind in her L eye     Pt ambulatory to triage with her family.  Daughter reports she had a conjunctival hemorrhage last week and today she is seeing black floaters.

## 2023-08-03 NOTE — ED NOTES
D/c pt home, no rx given . Pt aware of f/u instructions with Optho, aware to return for any changes or concerns. No further questions upon d/c home from ed

## 2023-08-03 NOTE — ED PROVIDER NOTES
ED Provider Note    Primary care provider: Malena Khalil M.D.    CHIEF COMPLAINT  Chief Complaint   Patient presents with    Blurred Vision     Daughter reports pt is seeing a black floating image in her R eye; Pt is blind in her L eye       HPI  Isha Khalil is a 73 y.o. female who presents to the Emergency Department with right eye visual changes.  The patient has had intermittent black floating squares in the right eye that have been coming and going over the past few days, not currently present, it moves with eye movement when it is present.  She denies any flashing lights or visual field deficits.  Her visual acuity at baseline is quite poor, the daughter states that even in the ophthalmology clinic she could only read the first letter.  This has been a chronic condition.  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    She does see Dr. Thomas Toribio for ophthalmology.  They were evaluating her for glaucoma on the last visit, did not start any medications.  Is scheduled to have a repeat visit in about 1 month.  They attempted to call the clinic to arrange appointments and the clinic told her to go to the emergency department as they did not have any available appointments.    She currently denies any pain but has had this discomfort occasionally.  She also apparently had a subconjunctival hemorrhage last week.  Denies any recent trauma.  Did have a prior surgery requiring stitches to the right eye, this was many years ago.    External record review: Patient seen in the primary care setting for COVID-like symptoms in February of this year, had PFTs done in April of this year, seen by Dr. Perez.  PFTs did not show any concerning pattern for COPD or asthma.  No prior ER visits in our system.    REVIEW OF SYSTEMS  See HPI.     PAST MEDICAL HISTORY       SURGICAL HISTORY   has a past surgical history that includes appendectomy (2008); other;  "and other (2016).    SOCIAL HISTORY  Social History     Tobacco Use    Smoking status: Never    Smokeless tobacco: Never   Vaping Use    Vaping Use: Never used   Substance Use Topics    Alcohol use: Never    Drug use: Never      Social History     Substance and Sexual Activity   Drug Use Never       FAMILY HISTORY  Family History   Problem Relation Age of Onset    No Known Problems Mother     No Known Problems Father        CURRENT MEDICATIONS  Reviewed.  See Encounter Summary.     ALLERGIES  No Known Allergies    PHYSICAL EXAM  VITAL SIGNS: Ht 1.549 m (5' 1\")   Wt 63.5 kg (139 lb 15.9 oz)   BMI 26.45 kg/m²   Constitutional: Awake, alert in no apparent distress.  HENT: Normocephalic, Bilateral external ears normal. Nose normal.   Eyes: Left eye blind at baseline, right eye: The pupil is irregular, 6 mm, somewhat sluggish and reactivity and appears to have some scarring in the cornea, difficult to focus on the retina.  IOP 17.  Bedside ultrasound images below  Thorax & Lungs: Easy unlabored respirations  Cardiovascular:    Abdomen:  No distention  Skin: Visualized skin is  Dry, No erythema, No rash.   Extremities:   atraumatic   Neurologic: Alert, Grossly non-focal.   Psychiatric: Affect and Mood normal    Point of Care Ultrasound    ED POINT OF CARE ULTRASOUND: OCULAR    Indication: Vision changes  Procedure:  Transverse and longitudinal views of the right eye were obtained.  Evidence of retinal or vitreous detachment was not seen.  Vitreous hemorrhage was seen.      Image retained through [facility] as seen below:       Additional interpretation: Appear to have a small amount of vitreous hemorrhage, there also is some hyperechoic shadowing behind the lens of uncertain significance.    This study is a limited ultrasound examination performed and interpreted to evaluate for limited conditions as outlined above. There may be other clinically important information contained in the images that is outside this scope. When " clinically warranted, a comprehensive ultrasound through the appropriate department is considered.             COURSE & MEDICAL DECISION MAKING  Pertinent Labs & Imaging studies reviewed. (See chart for details)    Differential diagnoses include but are not limited to: PVD, retinal detachment, retinal hemorrhage, glaucoma    10:24 AM - Nursing notes reviewed, patient seen and examined at bedside.    Discussion of management with other medical personnel: Discussed the case with Dr. Thomas Toribio, he reviewed his clinic notes.  She does have some scarring in bilateral eyes, multiple prior surgeries.  Agrees to have the patient follow-up in the clinic in the next week.      Decision Making:  This is a pleasant 73 y.o. year old female who presents with vision changes.  The patient has very poor vision at baseline, no acute vision changes today, she has had intermittent black floaters in the right eye.  No sign of a retinal detachment, there does appear to be some scarring of the cornea on ultrasound and physical exam which is consistent with her prior history.  Dr. Toribio reviewed her chart, she was scheduled to have a lens replacement but has not followed up.  The clinic should contact her and I also will tell the daughter to contact the clinic, therefore she has not lost due to follow-up.    Today there is no sign of acute angle-closure glaucoma, retinal detachment, presentation not concerning for retinal artery or retinal vein occlusion.       The patient was discharged (see d/c instructions) was told to return immediately for any signs or symptoms listed, or any worsening at all.  The patient verbally agreed to the discharge precautions and follow-up plan which is documented in EPIC.    Discharge Medications:  New Prescriptions    No medications on file       FINAL IMPRESSION  1. Visual changes

## 2023-08-03 NOTE — ED NOTES
Visual acuity test administered with results of left eye: Pt states they are not able to see anything, right eye 20/200.

## 2023-08-18 DIAGNOSIS — J45.40 MODERATE PERSISTENT ASTHMA WITHOUT COMPLICATION: ICD-10-CM

## 2023-08-19 NOTE — TELEPHONE ENCOUNTER
Received request via: Pharmacy    Was the patient seen in the last year in this department? Yes    Does the patient have an active prescription (recently filled or refills available) for medication(s) requested? yes    Does the patient have [facility] and need 100 day supply (blood pressure, diabetes and cholesterol meds only)? Patient does not have SCP   Requested Prescriptions     Pending Prescriptions Disp Refills    budesonide-formoterol (SYMBICORT) 160-4.5 MCG/ACT Aerosol  0

## 2023-08-24 RX ORDER — BUDESONIDE AND FORMOTEROL FUMARATE DIHYDRATE 160; 4.5 UG/1; UG/1
1 AEROSOL RESPIRATORY (INHALATION) 2 TIMES DAILY
Qty: 3 EACH | Refills: 3 | Status: SHIPPED
Start: 2023-08-24 | End: 2023-09-11 | Stop reason: CLARIF

## 2023-10-14 DIAGNOSIS — I10 BENIGN ESSENTIAL HTN: ICD-10-CM

## 2023-10-14 NOTE — TELEPHONE ENCOUNTER
Received request via: Pharmacy    Was the patient seen in the last year in this department? Yes    Does the patient have an active prescription (recently filled or refills available) for medication(s) requested? yes    Does the patient have [facility] and need 100 day supply (blood pressure, diabetes and cholesterol meds only)? Patient does not have SCP   Requested Prescriptions     Pending Prescriptions Disp Refills    losartan (COZAAR) 25 MG Tab [Pharmacy Med Name: LOSARTAN POTASSIUM 25 MG TAB] 90 Tablet 3     Sig: TAKE 1 TABLET BY MOUTH EVERY DAY IN THE EVENING

## 2023-10-15 DIAGNOSIS — E03.8 HYPOTHYROIDISM DUE TO HASHIMOTO'S THYROIDITIS: ICD-10-CM

## 2023-10-15 DIAGNOSIS — E06.3 HYPOTHYROIDISM DUE TO HASHIMOTO'S THYROIDITIS: ICD-10-CM

## 2023-10-15 NOTE — TELEPHONE ENCOUNTER
Received request via: Pharmacy    Was the patient seen in the last year in this department? No    Does the patient have an active prescription (recently filled or refills available) for medication(s) requested? yes    Does the patient have [facility] and need 100 day supply (blood pressure, diabetes and cholesterol meds only)? Patient does not have SCP   Requested Prescriptions     Pending Prescriptions Disp Refills    levothyroxine (SYNTHROID) 50 MCG Tab [Pharmacy Med Name: LEVOTHYROXINE 50 MCG TABLET] 90 Tablet 3     Sig: TAKE 1 TABLET BY MOUTH EVERY DAY IN THE MORNING ON AN EMPTY STOMACH

## 2023-10-17 RX ORDER — LEVOTHYROXINE SODIUM 0.05 MG/1
50 TABLET ORAL
Qty: 90 TABLET | Refills: 3 | Status: SHIPPED | OUTPATIENT
Start: 2023-10-17 | End: 2024-03-13

## 2023-10-17 RX ORDER — LOSARTAN POTASSIUM 25 MG/1
25 TABLET ORAL EVERY EVENING
Qty: 90 TABLET | Refills: 3 | Status: SHIPPED | OUTPATIENT
Start: 2023-10-17

## 2023-11-29 ENCOUNTER — PATIENT MESSAGE (OUTPATIENT)
Dept: HEALTH INFORMATION MANAGEMENT | Facility: OTHER | Age: 73
End: 2023-11-29

## 2023-12-27 DIAGNOSIS — I10 BENIGN ESSENTIAL HTN: ICD-10-CM

## 2023-12-27 RX ORDER — METOPROLOL SUCCINATE 25 MG/1
25 TABLET, EXTENDED RELEASE ORAL DAILY
Qty: 90 TABLET | Refills: 3 | Status: SHIPPED | OUTPATIENT
Start: 2023-12-27

## 2023-12-27 NOTE — TELEPHONE ENCOUNTER
Received request via: Pharmacy    Was the patient seen in the last year in this department? Yes    Does the patient have an active prescription (recently filled or refills available) for medication(s) requested? yes    Does the patient have [facility] and need 100 day supply (blood pressure, diabetes and cholesterol meds only)? Patient does not have SCP    Requested Prescriptions     Pending Prescriptions Disp Refills    metoprolol SR (TOPROL XL) 25 MG TABLET SR 24 HR [Pharmacy Med Name: METOPROLOL SUCC ER 25 MG TAB] 90 Tablet 3     Sig: TAKE 1 TABLET BY MOUTH EVERY DAY

## 2024-01-20 ENCOUNTER — OUTPATIENT INFUSION SERVICES (OUTPATIENT)
Dept: ONCOLOGY | Facility: MEDICAL CENTER | Age: 74
End: 2024-01-20
Attending: INTERNAL MEDICINE
Payer: MEDICARE

## 2024-01-20 VITALS
DIASTOLIC BLOOD PRESSURE: 82 MMHG | WEIGHT: 146.83 LBS | OXYGEN SATURATION: 95 % | SYSTOLIC BLOOD PRESSURE: 145 MMHG | BODY MASS INDEX: 28.83 KG/M2 | HEART RATE: 87 BPM | TEMPERATURE: 98.7 F | HEIGHT: 60 IN | RESPIRATION RATE: 18 BRPM

## 2024-01-20 DIAGNOSIS — M81.0 AGE-RELATED OSTEOPOROSIS WITHOUT CURRENT PATHOLOGICAL FRACTURE: ICD-10-CM

## 2024-01-20 LAB
CA-I BLD ISE-SCNC: 1.21 MMOL/L (ref 1.1–1.3)
CREAT BLD-MCNC: 0.6 MG/DL (ref 0.5–1.4)

## 2024-01-20 PROCEDURE — 82330 ASSAY OF CALCIUM: CPT

## 2024-01-20 PROCEDURE — 36415 COLL VENOUS BLD VENIPUNCTURE: CPT

## 2024-01-20 PROCEDURE — 82565 ASSAY OF CREATININE: CPT

## 2024-01-20 PROCEDURE — 700111 HCHG RX REV CODE 636 W/ 250 OVERRIDE (IP): Mod: JZ,JG,UD | Performed by: INTERNAL MEDICINE

## 2024-01-20 PROCEDURE — 96372 THER/PROPH/DIAG INJ SC/IM: CPT

## 2024-01-20 RX ORDER — EPINEPHRINE 1 MG/ML(1)
0.5 AMPUL (ML) INJECTION PRN
OUTPATIENT
Start: 2024-06-05

## 2024-01-20 RX ORDER — METHYLPREDNISOLONE SODIUM SUCCINATE 125 MG/2ML
125 INJECTION, POWDER, LYOPHILIZED, FOR SOLUTION INTRAMUSCULAR; INTRAVENOUS PRN
OUTPATIENT
Start: 2024-06-05

## 2024-01-20 RX ORDER — DIPHENHYDRAMINE HYDROCHLORIDE 50 MG/ML
50 INJECTION INTRAMUSCULAR; INTRAVENOUS PRN
OUTPATIENT
Start: 2024-06-05

## 2024-01-20 RX ADMIN — DENOSUMAB 60 MG: 60 INJECTION SUBCUTANEOUS at 13:57

## 2024-01-20 ASSESSMENT — FIBROSIS 4 INDEX: FIB4 SCORE: 1.42

## 2024-01-20 NOTE — PROGRESS NOTES
Pt arrived ambulatory accompanied by daughter for Q 6 month Prolia injection. Pt mostly Filipino speaking, daughter translated, denied need for  iPad.  Pt denies any s/s acute infection or illness, denies dental procedures in the past 4 weeks or upcoming dental procedures, denies s/s of hypocalcimia.  Pt confirms taking calcuim/vitamin D at home.    Istat Ca/Cr lab drawn from left AC, sterile gauze and coban to site.  Lab parameters met, Prolia injected Sub Q to back left arm per MAR, bandaid applied to site.  Pt tolerated well, discharged in no apparent distress, scheduling emailed to schedule next appt, pt's daughter will check My Chart for appt details.

## 2024-02-17 ENCOUNTER — HOSPITAL ENCOUNTER (OUTPATIENT)
Dept: LAB | Facility: MEDICAL CENTER | Age: 74
End: 2024-02-17
Attending: INTERNAL MEDICINE
Payer: MEDICARE

## 2024-02-17 DIAGNOSIS — E78.5 DYSLIPIDEMIA: ICD-10-CM

## 2024-02-17 DIAGNOSIS — E03.8 HYPOTHYROIDISM DUE TO HASHIMOTO'S THYROIDITIS: ICD-10-CM

## 2024-02-17 DIAGNOSIS — M81.0 AGE-RELATED OSTEOPOROSIS WITHOUT CURRENT PATHOLOGICAL FRACTURE: ICD-10-CM

## 2024-02-17 DIAGNOSIS — R79.89 LOW VITAMIN D LEVEL: ICD-10-CM

## 2024-02-17 DIAGNOSIS — D72.819 LEUKOPENIA, UNSPECIFIED TYPE: ICD-10-CM

## 2024-02-17 DIAGNOSIS — E06.3 HYPOTHYROIDISM DUE TO HASHIMOTO'S THYROIDITIS: ICD-10-CM

## 2024-02-17 LAB
25(OH)D3 SERPL-MCNC: 57 NG/ML (ref 30–100)
ALBUMIN SERPL BCP-MCNC: 4.3 G/DL (ref 3.2–4.9)
ALBUMIN/GLOB SERPL: 1.5 G/DL
ALP SERPL-CCNC: 67 U/L (ref 30–99)
ALT SERPL-CCNC: 21 U/L (ref 2–50)
ANION GAP SERPL CALC-SCNC: 11 MMOL/L (ref 7–16)
AST SERPL-CCNC: 23 U/L (ref 12–45)
BASOPHILS # BLD AUTO: 0.6 % (ref 0–1.8)
BASOPHILS # BLD: 0.03 K/UL (ref 0–0.12)
BILIRUB SERPL-MCNC: 0.7 MG/DL (ref 0.1–1.5)
BUN SERPL-MCNC: 8 MG/DL (ref 8–22)
CALCIUM ALBUM COR SERPL-MCNC: 8.4 MG/DL (ref 8.5–10.5)
CALCIUM SERPL-MCNC: 8.6 MG/DL (ref 8.4–10.2)
CHLORIDE SERPL-SCNC: 99 MMOL/L (ref 96–112)
CHOLEST SERPL-MCNC: 152 MG/DL (ref 100–199)
CO2 SERPL-SCNC: 26 MMOL/L (ref 20–33)
CREAT SERPL-MCNC: 0.67 MG/DL (ref 0.5–1.4)
EOSINOPHIL # BLD AUTO: 0.08 K/UL (ref 0–0.51)
EOSINOPHIL NFR BLD: 1.5 % (ref 0–6.9)
ERYTHROCYTE [DISTWIDTH] IN BLOOD BY AUTOMATED COUNT: 43.5 FL (ref 35.9–50)
FASTING STATUS PATIENT QL REPORTED: NORMAL
GFR SERPLBLD CREATININE-BSD FMLA CKD-EPI: 91 ML/MIN/1.73 M 2
GLOBULIN SER CALC-MCNC: 2.9 G/DL (ref 1.9–3.5)
GLUCOSE SERPL-MCNC: 106 MG/DL (ref 65–99)
HCT VFR BLD AUTO: 38.8 % (ref 37–47)
HDLC SERPL-MCNC: 68 MG/DL
HGB BLD-MCNC: 13.1 G/DL (ref 12–16)
IMM GRANULOCYTES # BLD AUTO: 0.02 K/UL (ref 0–0.11)
IMM GRANULOCYTES NFR BLD AUTO: 0.4 % (ref 0–0.9)
LDLC SERPL CALC-MCNC: 66 MG/DL
LYMPHOCYTES # BLD AUTO: 1.43 K/UL (ref 1–4.8)
LYMPHOCYTES NFR BLD: 27.4 % (ref 22–41)
MCH RBC QN AUTO: 30.4 PG (ref 27–33)
MCHC RBC AUTO-ENTMCNC: 33.8 G/DL (ref 32.2–35.5)
MCV RBC AUTO: 90 FL (ref 81.4–97.8)
MONOCYTES # BLD AUTO: 0.54 K/UL (ref 0–0.85)
MONOCYTES NFR BLD AUTO: 10.4 % (ref 0–13.4)
NEUTROPHILS # BLD AUTO: 3.11 K/UL (ref 1.82–7.42)
NEUTROPHILS NFR BLD: 59.7 % (ref 44–72)
NRBC # BLD AUTO: 0 K/UL
NRBC BLD-RTO: 0 /100 WBC (ref 0–0.2)
PLATELET # BLD AUTO: 231 K/UL (ref 164–446)
PMV BLD AUTO: 8.7 FL (ref 9–12.9)
POTASSIUM SERPL-SCNC: 4.7 MMOL/L (ref 3.6–5.5)
PROT SERPL-MCNC: 7.2 G/DL (ref 6–8.2)
RBC # BLD AUTO: 4.31 M/UL (ref 4.2–5.4)
SODIUM SERPL-SCNC: 136 MMOL/L (ref 135–145)
T4 FREE SERPL-MCNC: 1.62 NG/DL (ref 0.93–1.7)
TRIGL SERPL-MCNC: 89 MG/DL (ref 0–149)
TSH SERPL DL<=0.005 MIU/L-ACNC: 5.42 UIU/ML (ref 0.38–5.33)
WBC # BLD AUTO: 5.2 K/UL (ref 4.8–10.8)

## 2024-02-17 PROCEDURE — 84443 ASSAY THYROID STIM HORMONE: CPT

## 2024-02-17 PROCEDURE — 85025 COMPLETE CBC W/AUTO DIFF WBC: CPT

## 2024-02-17 PROCEDURE — 80053 COMPREHEN METABOLIC PANEL: CPT

## 2024-02-17 PROCEDURE — 36415 COLL VENOUS BLD VENIPUNCTURE: CPT

## 2024-02-17 PROCEDURE — 84439 ASSAY OF FREE THYROXINE: CPT

## 2024-02-17 PROCEDURE — 82306 VITAMIN D 25 HYDROXY: CPT

## 2024-02-17 PROCEDURE — 80061 LIPID PANEL: CPT

## 2024-03-08 ENCOUNTER — APPOINTMENT (OUTPATIENT)
Dept: MEDICAL GROUP | Facility: MEDICAL CENTER | Age: 74
End: 2024-03-08
Payer: MEDICARE

## 2024-03-13 ENCOUNTER — OFFICE VISIT (OUTPATIENT)
Dept: MEDICAL GROUP | Facility: MEDICAL CENTER | Age: 74
End: 2024-03-13
Payer: MEDICARE

## 2024-03-13 VITALS
DIASTOLIC BLOOD PRESSURE: 70 MMHG | WEIGHT: 143 LBS | OXYGEN SATURATION: 98 % | SYSTOLIC BLOOD PRESSURE: 120 MMHG | TEMPERATURE: 98.2 F | HEART RATE: 86 BPM | BODY MASS INDEX: 27.71 KG/M2

## 2024-03-13 DIAGNOSIS — Z12.31 ENCOUNTER FOR SCREENING MAMMOGRAM FOR MALIGNANT NEOPLASM OF BREAST: ICD-10-CM

## 2024-03-13 DIAGNOSIS — E83.51 HYPOCALCEMIA: ICD-10-CM

## 2024-03-13 DIAGNOSIS — E06.3 HYPOTHYROIDISM DUE TO HASHIMOTO'S THYROIDITIS: ICD-10-CM

## 2024-03-13 DIAGNOSIS — E03.8 HYPOTHYROIDISM DUE TO HASHIMOTO'S THYROIDITIS: ICD-10-CM

## 2024-03-13 DIAGNOSIS — Z00.00 ENCOUNTER FOR MEDICARE ANNUAL WELLNESS EXAM: ICD-10-CM

## 2024-03-13 DIAGNOSIS — R25.2 LEG CRAMPS: ICD-10-CM

## 2024-03-13 DIAGNOSIS — Z23 NEED FOR VACCINATION: ICD-10-CM

## 2024-03-13 PROCEDURE — G0438 PPPS, INITIAL VISIT: HCPCS | Performed by: INTERNAL MEDICINE

## 2024-03-13 PROCEDURE — 3078F DIAST BP <80 MM HG: CPT | Performed by: INTERNAL MEDICINE

## 2024-03-13 PROCEDURE — 3074F SYST BP LT 130 MM HG: CPT | Performed by: INTERNAL MEDICINE

## 2024-03-13 RX ORDER — LEVOTHYROXINE SODIUM 0.05 MG/1
TABLET ORAL
Qty: 110 TABLET | Refills: 3 | Status: SHIPPED | OUTPATIENT
Start: 2024-03-13

## 2024-03-13 ASSESSMENT — FIBROSIS 4 INDEX: FIB4 SCORE: 1.63

## 2024-03-13 ASSESSMENT — ACTIVITIES OF DAILY LIVING (ADL)
TRANSPORTATION COMMENTS: PT DOESN'T DRIVE
BATHING_REQUIRES_ASSISTANCE: 0

## 2024-03-13 ASSESSMENT — ENCOUNTER SYMPTOMS: GENERAL WELL-BEING: FAIR

## 2024-03-13 ASSESSMENT — PATIENT HEALTH QUESTIONNAIRE - PHQ9: CLINICAL INTERPRETATION OF PHQ2 SCORE: 0

## 2024-03-14 NOTE — PROGRESS NOTES
Chief Complaint   Patient presents with    Annual Exam       HPI:  Isha Khalil is a 73 y.o. here for Medicare Annual Wellness Visit     Patient Active Problem List    Diagnosis Date Noted    Dyslipidemia 10/14/2022    Hypothyroidism due to Hashimoto's thyroiditis 10/14/2022    Benign essential HTN 10/14/2022    Moderate persistent asthma without complication 10/14/2022    History of pulmonary embolus (PE) 10/14/2022    Other insomnia 10/14/2022    Multinodular goiter sp hemithyroidectomy 10/14/2022    Age-related osteoporosis without current pathological fracture 10/14/2022    History of GI bleed from erosion 10/14/2022    Corneal abnormality, left 10/14/2022    Legal blindness of right eye, as defined in [place] 10/14/2022    History of glaucoma, right 10/14/2022    Other age-related cataract, left 10/14/2022       Current Outpatient Medications   Medication Sig Dispense Refill    levothyroxine (SYNTHROID) 50 MCG Tab Take orally. 1 tab daily from Monday to Friday. 2 tabs daily on Sat and Sun. 110 Tablet 3    ofloxacin (OCUFLOX) 0.3 % Solution Administer 1 Drop into the left eye 2 times a day. 10 mL 3    metoprolol SR (TOPROL XL) 25 MG TABLET SR 24 HR TAKE 1 TABLET BY MOUTH EVERY DAY 90 Tablet 3    rosuvastatin (CRESTOR) 5 MG Tab TAKE 1 TABLET BY MOUTH EVERY DAY IN THE EVENING 90 Tablet 3    losartan (COZAAR) 25 MG Tab TAKE 1 TABLET BY MOUTH EVERY DAY IN THE EVENING 90 Tablet 3    fluticasone-salmeterol (ADVAIR) 100-50 MCG/ACT AEROSOL POWDER, BREATH ACTIVATED Inhale 1 Puff every 12 hours. 180 Each 3    PROLIA 60 MG/ML Solution Prefilled Syringe injection       vitamin D2, Ergocalciferol, (DRISDOL) 1.25 MG (24114 UT) Cap capsule TAKE 1 CAPSULE BY MOUTH ONE TIME PER WEEK 12 Capsule 3    Calcium-Magnesium-Vitamin D (CALCIUM 1200+D3 PO) Take  by mouth.      aspirin 81 MG EC tablet Take 81 mg by mouth every day.       No current facility-administered medications for this visit.          Current supplements  as per medication list.     Allergies: Patient has no known allergies.    Current social contact/activities:  lives with family. Does exercises for back near bed time.     She  reports that she has never smoked. She has never used smokeless tobacco. She reports that she does not drink alcohol and does not use drugs.  Counseling given: Not Answered      ROS:    Gait: Uses no assistive device  Ostomy: No  Other tubes: No  Amputations: No  Chronic oxygen use: No  Last eye exam: 2024  Wears hearing aids: No   : Reports urinary leakage during the last 6 months that has somewhat interfered with their daily activities or sleep.    Screening:    Depression Screening  Little interest or pleasure in doing things?  0 - not at all  Feeling down, depressed , or hopeless? 0 - not at all  Patient Health Questionnaire Score: 0     If depressive symptoms identified deferred to follow up visit unless specifically addressed in assessment and plan.    Interpretation of PHQ-9 Total Score   Score Severity   1-4 No Depression   5-9 Mild Depression   10-14 Moderate Depression   15-19 Moderately Severe Depression   20-27 Severe Depression    Screening for Cognitive Impairment  Do you or any of your friends or family members have any concern about your memory? No  Three Minute Recall (Leader, Season, Table) 3/3    Tommie clock face with all 12 numbers and set the hands to show 10 minutes after 11.  Yes    Cognitive concerns identified deferred for follow up unless specifically addressed in assessment and plan.    Fall Risk Assessment  Has the patient had two or more falls in the last year or any fall with injury in the last year?  No    Safety Assessment  Do you always wear your seatbelt?  Yes  Any changes to home needed to function safely? No  Difficulty hearing.  Yes  Patient counseled about all safety risks that were identified.    Functional Assessment ADLs  Are there any barriers preventing you from cooking for yourself or meeting  nutritional needs?  No.    Are there any barriers preventing you from driving safely or obtaining transportation?  No. Pt doesn't drive  Are there any barriers preventing you from using a telephone or calling for help?  No    Are there any barriers preventing you from shopping?  No.    Are there any barriers preventing you from taking care of your own finances?  No    Are there any barriers preventing you from managing your medications?  No    Are there any barriers preventing you from showering, bathing or dressing yourself? No    Are there any barriers preventing you from doing housework or laundry? No  Are there any barriers preventing you from using the toilet?No  Are you currently engaging in any exercise or physical activity?  Yes. Walking     Self-Assessment of Health  What is your perception of your health? Fair  Do you sleep more than six hours a night? Yes  In the past 7 days, how much did pain keep you from doing your normal work? Some  Do you spend quality time with family or friends (virtually or in person)? Yes  Do you usually eat a heart healthy diet that constists of a variety of fruits, vegetables, whole grains and fiber? Yes  Do you eat foods high in fat and/or Fast Food more than three times per week? Yes    Advance Care Planning  Do you have an Advance Directive, Living Will, Durable Power of , or POLST? No                 Health Maintenance Summary            Overdue - IMM DTaP/Tdap/Td Vaccine (1 - Tdap) Never done      No completion history exists for this topic.              Overdue - Zoster (Shingles) Vaccines (1 of 2) Never done      No completion history exists for this topic.              Annual Wellness Visit (Yearly) Next due on 3/13/2025      03/13/2024  Visit Dx: Encounter for Medicare annual wellness exam              Colorectal Cancer Screening (Colon Cancer Screening Cologuard Stool (FIT DNA) - Preferred) Tentatively due on 1/8/2027 01/08/2024  COLOGUARD COLON CANCER  SCREENING    01/08/2024  COLOGUARD COLON CANCER SCREENING              Bone Density Scan (Every 5 Years) Next due on 11/11/2027 11/11/2022  DS-BONE DENSITY STUDY (DEXA)              Hepatitis C Screening  Tentatively Complete      10/22/2022  Hepatitis C Antibody component of HEP C VIRUS ANTIBODY              Influenza Vaccine (Series Information) Completed      09/10/2023  Imm Admin: Influenza Vaccine, Quadrivalent, Adjuvanted (Pf)              Pneumococcal Vaccine: 65+ Years (Series Information) Completed      09/10/2023  Imm Admin: Pneumococcal Conjugate Vaccine (PCV20)              COVID-19 Vaccine (Series Information) Completed      10/21/2023  Imm Admin: Comirnaty (Covid-19 Vaccine, Mrna, 2713-0765 Formula)    09/28/2022  Imm Admin: PFIZER BIVALENT SARS-COV-2 VACCINE (12+)              Hepatitis A Vaccine (Hep A) (Series Information) Aged Out      No completion history exists for this topic.              Hepatitis B Vaccine (Hep B) (Series Information) Aged Out      No completion history exists for this topic.              HPV Vaccines (Series Information) Aged Out      No completion history exists for this topic.              Polio Vaccine (Inactivated Polio) (Series Information) Aged Out      No completion history exists for this topic.              Meningococcal Immunization (Series Information) Aged Out      No completion history exists for this topic.                    Patient Care Team:  Malena Khalil M.D. as PCP - General (Internal Medicine)        Social History     Tobacco Use    Smoking status: Never    Smokeless tobacco: Never   Vaping Use    Vaping Use: Never used   Substance Use Topics    Alcohol use: Never    Drug use: Never     Family History   Problem Relation Age of Onset    No Known Problems Mother     No Known Problems Father      She  has no past medical history on file.   Past Surgical History:   Procedure Laterality Date    OTHER  2016    hemithyroidectomy, left    APPENDECTOMY  2008     OTHER      anal fissure repair       Exam:   /70 (BP Location: Right arm, Patient Position: Sitting, BP Cuff Size: Adult)   Pulse 86   Temp 36.8 °C (98.2 °F) (Temporal)   Wt 64.9 kg (143 lb)   SpO2 98%  Body mass index is 27.71 kg/m².    Hearing fair.    Dentition upper dentures  Alert, oriented in no acute distress.  Eye contact is good, speech goal directed, affect calm    Assessment and Plan. The following treatment and monitoring plan is recommended:    1. Encounter for Medicare annual wellness exam    2. Leg cramps  Leg cramps in calves and feet at night. No symptoms with walking, happens mostly in lying position.   She takes calcium gummies daily. Try Mg supplement. But since it's positional, I'm suspecting it is from lumbar spine stenosis. Over the years, she has back pain, neck pain and has known DDD in cervical spin. We could consider PT, MRI spine if no improvement.  Check Mg with next lab.    3. Hypothyroidism due to Hashimoto's thyroiditis  TSH is mildly elevated. Try taking 2 extra tabs per week. Recheck in 6 weeks.  - levothyroxine (SYNTHROID) 50 MCG Tab; Take orally. 1 tab daily from Monday to Friday. 2 tabs daily on Sat and Sun.  Dispense: 110 Tablet; Refill: 3  - FREE THYROXINE; Future  - TSH; Future    4. Hypocalcemia  - CALCIUM (CA); Future    5. Need for vaccination  Recommended Shingrix at the pharmacy.    Services suggested: No services needed at this time  Health Care Screening: Age-appropriate preventive services recommended by USPTF and ACIP covered by Medicare were discussed today. Services ordered if indicated and agreed upon by the patient.  Referrals offered: Community-based lifestyle interventions to reduce health risks and promote self-management and wellness, fall prevention, nutrition, physical activity, tobacco-use cessation, weight loss, and mental health services as per orders if indicated.    Discussion today about general wellness and lifestyle habits:    Prevent  falls and reduce trip hazards; Cautioned about securing or removing rugs.  Have a working fire alarm and carbon monoxide detector;   Engage in regular physical activity and social activities     Follow-up: No follow-ups on file.

## 2024-07-21 ENCOUNTER — OUTPATIENT INFUSION SERVICES (OUTPATIENT)
Dept: ONCOLOGY | Facility: MEDICAL CENTER | Age: 74
End: 2024-07-21
Attending: INTERNAL MEDICINE
Payer: MEDICARE

## 2024-07-21 VITALS
OXYGEN SATURATION: 96 % | DIASTOLIC BLOOD PRESSURE: 80 MMHG | RESPIRATION RATE: 18 BRPM | TEMPERATURE: 97.4 F | BODY MASS INDEX: 28.74 KG/M2 | HEART RATE: 94 BPM | HEIGHT: 60 IN | WEIGHT: 146.39 LBS | SYSTOLIC BLOOD PRESSURE: 140 MMHG

## 2024-07-21 DIAGNOSIS — M81.0 AGE-RELATED OSTEOPOROSIS WITHOUT CURRENT PATHOLOGICAL FRACTURE: ICD-10-CM

## 2024-07-21 LAB
CA-I BLD ISE-SCNC: 1.09 MMOL/L (ref 1.1–1.3)
CREAT BLD-MCNC: 0.7 MG/DL (ref 0.5–1.4)

## 2024-07-21 PROCEDURE — 96372 THER/PROPH/DIAG INJ SC/IM: CPT

## 2024-07-21 PROCEDURE — 700111 HCHG RX REV CODE 636 W/ 250 OVERRIDE (IP): Mod: JZ,JG | Performed by: INTERNAL MEDICINE

## 2024-07-21 PROCEDURE — 82565 ASSAY OF CREATININE: CPT

## 2024-07-21 PROCEDURE — 82330 ASSAY OF CALCIUM: CPT

## 2024-07-21 PROCEDURE — 36415 COLL VENOUS BLD VENIPUNCTURE: CPT

## 2024-07-21 RX ORDER — METHYLPREDNISOLONE SODIUM SUCCINATE 125 MG/2ML
125 INJECTION, POWDER, LYOPHILIZED, FOR SOLUTION INTRAMUSCULAR; INTRAVENOUS PRN
OUTPATIENT
Start: 2025-01-14

## 2024-07-21 RX ORDER — EPINEPHRINE 1 MG/ML(1)
0.5 AMPUL (ML) INJECTION PRN
OUTPATIENT
Start: 2025-01-14

## 2024-07-21 RX ORDER — DIPHENHYDRAMINE HYDROCHLORIDE 50 MG/ML
50 INJECTION INTRAMUSCULAR; INTRAVENOUS PRN
OUTPATIENT
Start: 2025-01-14

## 2024-07-21 RX ADMIN — DENOSUMAB 60 MG: 60 INJECTION SUBCUTANEOUS at 13:46

## 2024-07-21 ASSESSMENT — FIBROSIS 4 INDEX: FIB4 SCORE: 1.65

## 2024-09-18 DIAGNOSIS — J45.40 MODERATE PERSISTENT ASTHMA WITHOUT COMPLICATION: ICD-10-CM

## 2024-09-18 DIAGNOSIS — E03.8 HYPOTHYROIDISM DUE TO HASHIMOTO'S THYROIDITIS: ICD-10-CM

## 2024-09-18 DIAGNOSIS — E06.3 HYPOTHYROIDISM DUE TO HASHIMOTO'S THYROIDITIS: ICD-10-CM

## 2024-09-18 DIAGNOSIS — I10 BENIGN ESSENTIAL HTN: ICD-10-CM

## 2024-09-18 DIAGNOSIS — E78.5 DYSLIPIDEMIA: ICD-10-CM

## 2024-09-18 DIAGNOSIS — H44.002 EYE INFECTION, LEFT: ICD-10-CM

## 2024-09-18 RX ORDER — LOSARTAN POTASSIUM 25 MG/1
25 TABLET ORAL EVERY EVENING
Qty: 90 TABLET | Refills: 3 | Status: SHIPPED | OUTPATIENT
Start: 2024-09-18

## 2024-09-18 RX ORDER — ROSUVASTATIN CALCIUM 5 MG/1
5 TABLET, COATED ORAL EVERY EVENING
Qty: 90 TABLET | Refills: 3 | Status: SHIPPED | OUTPATIENT
Start: 2024-09-18

## 2024-09-18 RX ORDER — OFLOXACIN 3 MG/ML
1 SOLUTION/ DROPS OPHTHALMIC 2 TIMES DAILY
Qty: 10 ML | Refills: 3 | Status: SHIPPED | OUTPATIENT
Start: 2024-09-18

## 2024-09-18 RX ORDER — LEVOTHYROXINE SODIUM 50 UG/1
TABLET ORAL
Qty: 110 TABLET | Refills: 3 | Status: SHIPPED | OUTPATIENT
Start: 2024-09-18

## 2024-09-18 RX ORDER — FLUTICASONE PROPIONATE AND SALMETEROL 100; 50 UG/1; UG/1
1 POWDER RESPIRATORY (INHALATION) EVERY 12 HOURS
Qty: 180 EACH | Refills: 3 | Status: SHIPPED | OUTPATIENT
Start: 2024-09-18

## 2024-09-18 RX ORDER — METOPROLOL SUCCINATE 25 MG/1
25 TABLET, EXTENDED RELEASE ORAL DAILY
Qty: 90 TABLET | Refills: 3 | Status: SHIPPED | OUTPATIENT
Start: 2024-09-18

## 2024-09-24 ENCOUNTER — HOSPITAL ENCOUNTER (OUTPATIENT)
Dept: RADIOLOGY | Facility: MEDICAL CENTER | Age: 74
End: 2024-09-24
Attending: INTERNAL MEDICINE
Payer: MEDICARE

## 2024-09-24 DIAGNOSIS — Z12.31 ENCOUNTER FOR SCREENING MAMMOGRAM FOR MALIGNANT NEOPLASM OF BREAST: ICD-10-CM

## 2024-09-24 PROCEDURE — 77067 SCR MAMMO BI INCL CAD: CPT

## 2024-11-27 ENCOUNTER — APPOINTMENT (OUTPATIENT)
Dept: MEDICAL GROUP | Facility: MEDICAL CENTER | Age: 74
End: 2024-11-27
Payer: MEDICARE

## 2024-11-27 VITALS
SYSTOLIC BLOOD PRESSURE: 128 MMHG | HEIGHT: 60 IN | DIASTOLIC BLOOD PRESSURE: 62 MMHG | HEART RATE: 82 BPM | TEMPERATURE: 98.6 F | OXYGEN SATURATION: 99 % | BODY MASS INDEX: 28.43 KG/M2 | WEIGHT: 144.8 LBS

## 2024-11-27 DIAGNOSIS — E55.9 VITAMIN D DEFICIENCY: ICD-10-CM

## 2024-11-27 DIAGNOSIS — E06.3 HYPOTHYROIDISM DUE TO HASHIMOTO'S THYROIDITIS: ICD-10-CM

## 2024-11-27 DIAGNOSIS — E78.5 DYSLIPIDEMIA: ICD-10-CM

## 2024-11-27 DIAGNOSIS — I10 BENIGN ESSENTIAL HTN: ICD-10-CM

## 2024-11-27 DIAGNOSIS — M81.0 AGE-RELATED OSTEOPOROSIS WITHOUT CURRENT PATHOLOGICAL FRACTURE: ICD-10-CM

## 2024-11-27 DIAGNOSIS — E83.51 HYPOCALCEMIA: ICD-10-CM

## 2024-11-27 PROCEDURE — 3078F DIAST BP <80 MM HG: CPT | Performed by: INTERNAL MEDICINE

## 2024-11-27 PROCEDURE — 99214 OFFICE O/P EST MOD 30 MIN: CPT | Performed by: INTERNAL MEDICINE

## 2024-11-27 PROCEDURE — 3074F SYST BP LT 130 MM HG: CPT | Performed by: INTERNAL MEDICINE

## 2024-11-27 ASSESSMENT — FIBROSIS 4 INDEX: FIB4 SCORE: 1.65

## 2024-11-27 NOTE — PROGRESS NOTES
CC:   Chief Complaint   Patient presents with    Establish Care     PCP dr scott marin, no other questions       Diagnoses of Dyslipidemia, Hypocalcemia, Benign essential HTN, Hypothyroidism due to Hashimoto's thyroiditis, Vitamin D deficiency, and Age-related osteoporosis without current pathological fracture were pertinent to this visit.  Verbal consent was acquired by the patient to use [employer] ambient listening note generation during this visit Yes     History of Present Illness  Isha is a pleasant 74 y.o. female who presents today to establish care and to discuss the following problems:     She is currently on a regimen of baby aspirin, calcium with vitamin D, Advair inhaler for asthma, levothyroxine (1 tablet on weekdays and 2 tablets on weekends), losartan 25 mg, and metoprolol 25 mg for blood pressure management. She monitors her blood pressure at home sporadically.    She receives Prolia injections biannually for bone health and has been on Prolia since 2022. Her next injection is scheduled for January 2025. She takes calcium supplements and her vitamin D intake is inconsistent, ranging from 2 to 3 times a week.    She takes rosuvastatin 5 mg for cholesterol control.    She has expressed a desire to undergo mammograms every other year but is currently not interested in having one.      History reviewed. No pertinent past medical history.    Current Outpatient Medications Ordered in Epic   Medication Sig Dispense Refill    fluticasone-salmeterol (ADVAIR) 100-50 MCG/ACT AEROSOL POWDER, BREATH ACTIVATED Inhale 1 Puff every 12 hours. 180 Each 3    levothyroxine (SYNTHROID) 50 MCG Tab Take orally. 1 tab daily from Monday to Friday. 2 tabs daily on Sat and Sun. 110 Tablet 3    losartan (COZAAR) 25 MG Tab Take 1 Tablet by mouth every evening. 90 Tablet 3    metoprolol SR (TOPROL XL) 25 MG TABLET SR 24 HR Take 1 Tablet by mouth every day. 90 Tablet 3    ofloxacin (OCUFLOX) 0.3 % Solution Administer 1 Drop into  the left eye 2 times a day. 10 mL 3    rosuvastatin (CRESTOR) 5 MG Tab Take 1 Tablet by mouth every evening. 90 Tablet 3    PROLIA 60 MG/ML Solution Prefilled Syringe injection       vitamin D2, Ergocalciferol, (DRISDOL) 1.25 MG (86478 UT) Cap capsule TAKE 1 CAPSULE BY MOUTH ONE TIME PER WEEK 12 Capsule 3    Calcium-Magnesium-Vitamin D (CALCIUM 1200+D3 PO) Take  by mouth.      aspirin 81 MG EC tablet Take 81 mg by mouth every day.       No current [facility]-ordered facility-administered medications on file.     Review of Systems   All other systems reviewed and are negative.    Objective:     Exam:  /62 (BP Location: Right arm, Patient Position: Sitting, BP Cuff Size: Adult)   Pulse 82   Temp 37 °C (98.6 °F) (Temporal)   Ht 1.524 m (5')   Wt 65.7 kg (144 lb 12.8 oz)   SpO2 99%   BMI 28.28 kg/m²  Body mass index is 28.28 kg/m².    Physical Exam  Constitutional:       Appearance: Normal appearance.   HENT:      Head: Normocephalic.   Cardiovascular:      Pulses: Normal pulses.      Heart sounds: Normal heart sounds.   Pulmonary:      Effort: Pulmonary effort is normal. No respiratory distress.      Breath sounds: Normal breath sounds.   Neurological:      Mental Status: She is alert.   Psychiatric:         Mood and Affect: Mood normal.         Behavior: Behavior normal.         Thought Content: Thought content normal.         Judgment: Judgment normal.       Assessment & Plan:   Isha  is a pleasant 74 y.o. female with the following -     Assessment & Plan  1. Osteoporosis.  She has been receiving Prolia injections every 6 months since 2022. A bone density test will be ordered as it has been over 2 years since the last one. She is advised to ensure adequate intake of calcium and vitamin D. A blood test will also be ordered.    2. Asthma.  She is currently using an Advair inhaler for asthma management.    3. Hypertension.  She is taking losartan 25 mg and metoprolol 25 mg for blood pressure management. Her blood  pressure readings are within the normal range. She is advised to monitor her blood pressure twice weekly, aiming for readings below 130/80.    4. Hyperlipidemia.  She is taking rosuvastatin 5 mg for cholesterol management.    5. Hypothyroidism.  She is taking levothyroxine with a dosage regimen of 1 tablet on weekdays and 2 tablets on weekends.    6. Health Maintenance.  She needs her second dose of the shingles vaccine. It is unclear if she has received a tetanus vaccine; she is advised to check with [facility]. She had a mammogram last year and is considering having it every other year. A stool test was done and was negative; she can have another stool test in 3 years if desired.    Follow-up  Return in March 2025 for Medicare annual wellness visit.    Problem List Items Addressed This Visit       Age-related osteoporosis without current pathological fracture    Relevant Orders    DS-BONE DENSITY STUDY (DEXA)    Benign essential HTN    Relevant Orders    CBC WITH DIFFERENTIAL    Dyslipidemia    Relevant Orders    Lipid Profile    Hypothyroidism due to Hashimoto's thyroiditis    Relevant Orders    TSH WITH REFLEX TO FT4     Other Visit Diagnoses       Hypocalcemia        Relevant Orders    Comp Metabolic Panel    Vitamin D deficiency        Relevant Orders    VITAMIN D,25 HYDROXY (DEFICIENCY)          No follow-ups on file. Follow-up  Return in March 2025 for Medicare annual wellness visit.    Please note that this dictation was created using voice recognition software. I have made every reasonable attempt to correct obvious errors, but I expect that there are errors of grammar and possibly content that I did not discover before finalizing the note.

## 2025-01-20 ENCOUNTER — HOSPITAL ENCOUNTER (OUTPATIENT)
Dept: RADIOLOGY | Facility: MEDICAL CENTER | Age: 75
End: 2025-01-20
Attending: INTERNAL MEDICINE
Payer: MEDICARE

## 2025-01-20 DIAGNOSIS — M81.0 AGE-RELATED OSTEOPOROSIS WITHOUT CURRENT PATHOLOGICAL FRACTURE: ICD-10-CM

## 2025-01-20 PROCEDURE — 77080 DXA BONE DENSITY AXIAL: CPT

## 2025-01-22 ENCOUNTER — OUTPATIENT INFUSION SERVICES (OUTPATIENT)
Dept: ONCOLOGY | Facility: MEDICAL CENTER | Age: 75
End: 2025-01-22
Attending: INTERNAL MEDICINE
Payer: MEDICARE

## 2025-01-22 VITALS
HEIGHT: 60 IN | DIASTOLIC BLOOD PRESSURE: 89 MMHG | SYSTOLIC BLOOD PRESSURE: 155 MMHG | WEIGHT: 146.61 LBS | BODY MASS INDEX: 28.78 KG/M2 | HEART RATE: 108 BPM | OXYGEN SATURATION: 96 % | RESPIRATION RATE: 18 BRPM | TEMPERATURE: 98.4 F

## 2025-01-22 DIAGNOSIS — M81.0 AGE-RELATED OSTEOPOROSIS WITHOUT CURRENT PATHOLOGICAL FRACTURE: ICD-10-CM

## 2025-01-22 LAB
CA-I BLD ISE-SCNC: 1.16 MMOL/L (ref 1.1–1.3)
CREAT BLD-MCNC: 0.7 MG/DL (ref 0.5–1.4)

## 2025-01-22 PROCEDURE — 82565 ASSAY OF CREATININE: CPT

## 2025-01-22 PROCEDURE — 96372 THER/PROPH/DIAG INJ SC/IM: CPT

## 2025-01-22 PROCEDURE — 700111 HCHG RX REV CODE 636 W/ 250 OVERRIDE (IP): Mod: JZ,UD,TB | Performed by: INTERNAL MEDICINE

## 2025-01-22 PROCEDURE — 82330 ASSAY OF CALCIUM: CPT

## 2025-01-22 PROCEDURE — 36415 COLL VENOUS BLD VENIPUNCTURE: CPT

## 2025-01-22 RX ORDER — DIPHENHYDRAMINE HYDROCHLORIDE 50 MG/ML
50 INJECTION INTRAMUSCULAR; INTRAVENOUS PRN
OUTPATIENT
Start: 2025-07-16

## 2025-01-22 RX ORDER — EPINEPHRINE 1 MG/ML(1)
0.5 AMPUL (ML) INJECTION PRN
OUTPATIENT
Start: 2025-07-16

## 2025-01-22 RX ORDER — METHYLPREDNISOLONE SODIUM SUCCINATE 125 MG/2ML
125 INJECTION INTRAMUSCULAR; INTRAVENOUS PRN
OUTPATIENT
Start: 2025-07-16

## 2025-01-22 RX ADMIN — DENOSUMAB 60 MG: 60 INJECTION SUBCUTANEOUS at 08:45

## 2025-01-22 ASSESSMENT — FIBROSIS 4 INDEX: FIB4 SCORE: 1.65

## 2025-01-22 NOTE — PROGRESS NOTES
Pt arrived ambulatory accompanied by daughter for Q 6 month Prolia injection. Pt mostly Venezuelan speaking, daughter translated, denied need for  iPad.  Pt denies any s/s acute infection or illness, denies dental procedures in the past 4 weeks or upcoming dental procedures, denies s/s of hypocalcemia.  Pt confirms taking calcuim/vitamin D at home.    Istat Ca/Cr lab drawn from right AC, sterile gauze and coban to site.  Lab parameters met, Prolia injected Sub Q to back right arm per MAR, bandaid applied to site.  Pt tolerated well, discharged in no apparent distress, scheduling emailed to schedule next appt, pt's daughter will check My Chart for appt details.

## 2025-02-22 LAB
ALBUMIN SERPL-MCNC: 3.9 G/DL (ref 3.8–4.8)
ALP SERPL-CCNC: 73 IU/L (ref 44–121)
ALT SERPL-CCNC: 31 IU/L (ref 0–32)
AST SERPL-CCNC: 22 IU/L (ref 0–40)
BILIRUB SERPL-MCNC: 0.6 MG/DL (ref 0–1.2)
BUN SERPL-MCNC: 12 MG/DL (ref 8–27)
BUN/CREAT SERPL: 18 (ref 12–28)
CALCIUM SERPL-MCNC: 8.6 MG/DL (ref 8.7–10.3)
CHLORIDE SERPL-SCNC: 97 MMOL/L (ref 96–106)
CO2 SERPL-SCNC: 24 MMOL/L (ref 20–29)
CREAT SERPL-MCNC: 0.67 MG/DL (ref 0.57–1)
EGFRCR SERPLBLD CKD-EPI 2021: 91 ML/MIN/1.73
GLOBULIN SER CALC-MCNC: 2.4 G/DL (ref 1.5–4.5)
GLUCOSE SERPL-MCNC: 97 MG/DL (ref 70–99)
POTASSIUM SERPL-SCNC: 4.8 MMOL/L (ref 3.5–5.2)
PROT SERPL-MCNC: 6.3 G/DL (ref 6–8.5)
SODIUM SERPL-SCNC: 133 MMOL/L (ref 134–144)

## 2025-02-24 ENCOUNTER — RESULTS FOLLOW-UP (OUTPATIENT)
Dept: MEDICAL GROUP | Age: 75
End: 2025-02-24

## 2025-02-26 ENCOUNTER — TELEMEDICINE (OUTPATIENT)
Dept: MEDICAL GROUP | Age: 75
End: 2025-02-26
Payer: MEDICARE

## 2025-02-26 VITALS — BODY MASS INDEX: 26.87 KG/M2 | WEIGHT: 146 LBS | HEIGHT: 62 IN

## 2025-02-26 DIAGNOSIS — E87.1 HYPONATREMIA: ICD-10-CM

## 2025-02-26 RX ORDER — SODIUM CHLORIDE 1 G/1
1 TABLET ORAL
COMMUNITY
Start: 2025-02-18 | End: 2025-02-26 | Stop reason: SDUPTHER

## 2025-02-26 RX ORDER — SODIUM CHLORIDE 1 G/1
1 TABLET ORAL 2 TIMES DAILY
Qty: 180 TABLET | Refills: 1 | Status: SHIPPED | OUTPATIENT
Start: 2025-02-26

## 2025-02-26 ASSESSMENT — ENCOUNTER SYMPTOMS: COUGH: 1

## 2025-02-26 ASSESSMENT — FIBROSIS 4 INDEX: FIB4 SCORE: 1.3

## 2025-02-26 NOTE — PROGRESS NOTES
CC:   Chief Complaint   Patient presents with    [facility]     The encounter diagnosis was Hyponatremia.  Verbal consent was acquired by the patient to use [employer] ambient listening note generation during this visit Yes     This visit was conducted via Teams using secure and encrypted videoconferencing technology.   The [facility]    The patient's identity was confirmed and verbal consent was obtained for this virtual visit.   History of Present Illness  Isha is a pleasant 74 y.o. female with a past medical history of dyslipidemia, retinopathy, hypothyroidism, and a recent admission to the hospital for hyponatremia. She is presenting for a video visit follow-up. She is accompanied by her daughter, who is providing interpretation from [employer]  Official  services were offered and declined.    She was admitted with a sodium level of 107 and tested positive for influenza A. Her sodium levels corrected, and she was eventually discharged with a sodium level of 129. A repeat sodium test on 02/20/2025 showed a level of 133. She was discharged on salt tablets, 1 g three times daily. She received Tamiflu in the emergency room and was discharged on salt tablets. Her daughter reports that she is currently taking sodium chloride tablets twice daily, a reduction from the prescribed three times daily due to concerns about excessive sodium intake. The daughter has requested biweekly sodium level monitoring, with the intention of reducing the sodium chloride tablet dosage to once daily if the sodium levels normalize. The patient is currently residing in [place] with her other daughter. The daughter recalls an incident last year where the patient experienced abdominal pain and was found to have a sodium level of 126 during an ER visit.     The daughter does not believe a nephrologist's evaluation is necessary at this point, suspecting  "that the influenza may have caused hyponatremia with SIADH.    The daughter has requested a review of the chest x-ray report, as the patient had a small nodule identified on a previous CT scan. She is uncertain if this nodule was mentioned in the chest x-ray report from [facility].    The patient has been experiencing a cough at night and continues to use her inhalers. She has been taking cough syrup for symptom management.        Current Outpatient Medications Ordered in Epic   Medication Sig Dispense Refill    sodium chloride (SALT) 1 GM Tab Take 1 Tablet by mouth 2 times a day. 180 Tablet 1    fluticasone-salmeterol (ADVAIR) 100-50 MCG/ACT AEROSOL POWDER, BREATH ACTIVATED Inhale 1 Puff every 12 hours. 180 Each 3    levothyroxine (SYNTHROID) 50 MCG Tab Take orally. 1 tab daily from Monday to Friday. 2 tabs daily on Sat and Sun. 110 Tablet 3    losartan (COZAAR) 25 MG Tab Take 1 Tablet by mouth every evening. 90 Tablet 3    metoprolol SR (TOPROL XL) 25 MG TABLET SR 24 HR Take 1 Tablet by mouth every day. 90 Tablet 3    ofloxacin (OCUFLOX) 0.3 % Solution Administer 1 Drop into the left eye 2 times a day. 10 mL 3    rosuvastatin (CRESTOR) 5 MG Tab Take 1 Tablet by mouth every evening. 90 Tablet 3    PROLIA 60 MG/ML Solution Prefilled Syringe injection       vitamin D2, Ergocalciferol, (DRISDOL) 1.25 MG (66500 UT) Cap capsule TAKE 1 CAPSULE BY MOUTH ONE TIME PER WEEK 12 Capsule 3    Calcium-Magnesium-Vitamin D (CALCIUM 1200+D3 PO) Take  by mouth.      aspirin 81 MG EC tablet Take 81 mg by mouth every day.       No current [facility]-ordered facility-administered medications on file.     Review of Systems   Respiratory:  Positive for cough.    All other systems reviewed and are negative.      Objective:     Exam:  Ht 1.575 m (5' 2\")   Wt 66.2 kg (146 lb)   BMI 26.70 kg/m²  Body mass index is 26.7 kg/m².    Physical Exam  Constitutional:       Appearance: Normal appearance.      Comments: Limited exam due to " video visit    HENT:      Head: Normocephalic and atraumatic.   Pulmonary:      Effort: Pulmonary effort is normal. No respiratory distress.   Neurological:      Mental Status: She is alert.   Psychiatric:         Mood and Affect: Mood normal.         Behavior: Behavior normal.         Thought Content: Thought content normal.         Judgment: Judgment normal.       Results: I have personally reviewed 46 pages of medical records from [facility].  Please see media section 2/20/2025  Lab Results   Component Value Date/Time    SODIUM 133 (L) 02/21/2025 06:22 AM    SODIUM 136 02/17/2024 07:35 AM    POTASSIUM 4.8 02/21/2025 06:22 AM    POTASSIUM 4.7 02/17/2024 07:35 AM    CHLORIDE 97 02/21/2025 06:22 AM    CHLORIDE 99 02/17/2024 07:35 AM    CO2 24 02/21/2025 06:22 AM    CO2 26 02/17/2024 07:35 AM    ANION 11.0 02/17/2024 07:35 AM    GLUCOSE 97 02/21/2025 06:22 AM    GLUCOSE 106 (H) 02/17/2024 07:35 AM    BUN 12 02/21/2025 06:22 AM    BUN 8 02/17/2024 07:35 AM    CREATININE 0.67 02/21/2025 06:22 AM    CREATININE 0.67 02/17/2024 07:35 AM    CALCIUM 8.6 (L) 02/21/2025 06:22 AM    CALCIUM 8.6 02/17/2024 07:35 AM    ASTSGOT 22 02/21/2025 06:22 AM    ASTSGOT 23 02/17/2024 07:35 AM    ALTSGPT 31 02/21/2025 06:22 AM    ALTSGPT 21 02/17/2024 07:35 AM    TBILIRUBIN 0.6 02/21/2025 06:22 AM    TBILIRUBIN 0.7 02/17/2024 07:35 AM    ALBUMIN 3.9 02/21/2025 06:22 AM    ALBUMIN 4.3 02/17/2024 07:35 AM    TOTPROTEIN 6.3 02/21/2025 06:22 AM    TOTPROTEIN 7.2 02/17/2024 07:35 AM    GLOBULIN 2.4 02/21/2025 06:22 AM    GLOBULIN 2.9 02/17/2024 07:35 AM    AGRATIO 1.5 02/17/2024 07:35 AM     Results  Laboratory Studies  Sodium level was 107 upon admission, corrected to 129 at discharge, and was 133 on 02/20/2025.    Imaging  Chest x-ray from 02/14/2025 showed no definite evidence of acute cardiopulmonary disease, no infiltrate, no pleural effusion, no pneumothorax, mild atherosclerotic calcifications, minimal prominence of the  pulmonary interstitium, lungs are otherwise grossly clear. No mention of pulmonary nodule.    Assessment & Plan:   Isha  is a pleasant 74 y.o. female with the following -   Assessment & Plan  1. Hyponatremia.  Her sodium levels have been consistently within the normal range during routine laboratory tests. It is hypothesized that her sodium levels may only decrease during periods of active illness. Her sodium level was 136 a year ago and 139 two years ago. A prescription for sodium chloride 1 g twice daily has been sent to [facility] pharmacy. Biweekly sodium level monitoring will be initiated for a total of 8 times. If her sodium levels normalize, the dosage of sodium tablets can be reduced.    2. Pulmonary nodule.  Her chest x-ray from 02/14/2025 did not reveal any acute findings or evidence of a pulmonary nodule. There is no mention of pulmonary nodules in her previous records.    3. Cough.  She is currently using her inhalers and taking cough syrup.    Problem List Items Addressed This Visit       Hyponatremia    Relevant Medications    sodium chloride (SALT) 1 GM Tab    Other Relevant Orders    Comp Metabolic Panel     Return if symptoms worsen or fail to improve.    Please note that this dictation was created using voice recognition software. I have made every reasonable attempt to correct obvious errors, but I expect that there are errors of grammar and possibly content that I did not discover before finalizing the note.

## 2025-02-27 ENCOUNTER — PATIENT MESSAGE (OUTPATIENT)
Dept: MEDICAL GROUP | Age: 75
End: 2025-02-27
Payer: MEDICARE

## 2025-02-27 DIAGNOSIS — R05.9 COUGH, UNSPECIFIED TYPE: ICD-10-CM

## 2025-03-03 RX ORDER — BENZONATATE 100 MG/1
100 CAPSULE ORAL 3 TIMES DAILY PRN
Qty: 60 CAPSULE | Refills: 0 | Status: SHIPPED | OUTPATIENT
Start: 2025-03-03

## 2025-03-08 LAB
ALBUMIN SERPL-MCNC: 3.9 G/DL (ref 3.8–4.8)
ALP SERPL-CCNC: 69 IU/L (ref 44–121)
ALT SERPL-CCNC: 39 IU/L (ref 0–32)
AST SERPL-CCNC: 30 IU/L (ref 0–40)
BILIRUB SERPL-MCNC: 0.5 MG/DL (ref 0–1.2)
BUN SERPL-MCNC: 13 MG/DL (ref 8–27)
BUN/CREAT SERPL: 19 (ref 12–28)
CALCIUM SERPL-MCNC: 8.7 MG/DL (ref 8.7–10.3)
CHLORIDE SERPL-SCNC: 101 MMOL/L (ref 96–106)
CO2 SERPL-SCNC: 24 MMOL/L (ref 20–29)
CREAT SERPL-MCNC: 0.68 MG/DL (ref 0.57–1)
EGFRCR SERPLBLD CKD-EPI 2021: 90 ML/MIN/1.73
GLOBULIN SER CALC-MCNC: 2.7 G/DL (ref 1.5–4.5)
GLUCOSE SERPL-MCNC: 101 MG/DL (ref 70–99)
POTASSIUM SERPL-SCNC: 4.9 MMOL/L (ref 3.5–5.2)
PROT SERPL-MCNC: 6.6 G/DL (ref 6–8.5)
SODIUM SERPL-SCNC: 137 MMOL/L (ref 134–144)

## 2025-03-15 ENCOUNTER — HOSPITAL ENCOUNTER (OUTPATIENT)
Facility: MEDICAL CENTER | Age: 75
End: 2025-03-15
Attending: INTERNAL MEDICINE
Payer: MEDICARE

## 2025-03-15 ENCOUNTER — PATIENT MESSAGE (OUTPATIENT)
Dept: MEDICAL GROUP | Age: 75
End: 2025-03-15

## 2025-03-15 ENCOUNTER — APPOINTMENT (OUTPATIENT)
Dept: RADIOLOGY | Facility: MEDICAL CENTER | Age: 75
End: 2025-03-15
Attending: EMERGENCY MEDICINE
Payer: MEDICARE

## 2025-03-15 ENCOUNTER — APPOINTMENT (OUTPATIENT)
Dept: URGENT CARE | Facility: CLINIC | Age: 75
End: 2025-03-15
Payer: MEDICARE

## 2025-03-15 ENCOUNTER — HOSPITAL ENCOUNTER (EMERGENCY)
Facility: MEDICAL CENTER | Age: 75
End: 2025-03-15
Attending: EMERGENCY MEDICINE
Payer: MEDICARE

## 2025-03-15 VITALS
TEMPERATURE: 98.9 F | WEIGHT: 147.27 LBS | DIASTOLIC BLOOD PRESSURE: 78 MMHG | OXYGEN SATURATION: 98 % | HEART RATE: 92 BPM | SYSTOLIC BLOOD PRESSURE: 140 MMHG | BODY MASS INDEX: 27.1 KG/M2 | HEIGHT: 62 IN | RESPIRATION RATE: 18 BRPM

## 2025-03-15 DIAGNOSIS — R60.0 PERIPHERAL EDEMA: ICD-10-CM

## 2025-03-15 DIAGNOSIS — E87.1 HYPONATREMIA: ICD-10-CM

## 2025-03-15 LAB
ALBUMIN SERPL BCP-MCNC: 4.1 G/DL (ref 3.2–4.9)
ALBUMIN SERPL BCP-MCNC: 4.1 G/DL (ref 3.2–4.9)
ALBUMIN/GLOB SERPL: 1.5 G/DL
ALBUMIN/GLOB SERPL: 1.5 G/DL
ALP SERPL-CCNC: 66 U/L (ref 30–99)
ALP SERPL-CCNC: 67 U/L (ref 30–99)
ALT SERPL-CCNC: 29 U/L (ref 2–50)
ALT SERPL-CCNC: 29 U/L (ref 2–50)
ANION GAP SERPL CALC-SCNC: 10 MMOL/L (ref 7–16)
ANION GAP SERPL CALC-SCNC: 11 MMOL/L (ref 7–16)
AST SERPL-CCNC: 27 U/L (ref 12–45)
AST SERPL-CCNC: 28 U/L (ref 12–45)
BASOPHILS # BLD AUTO: 0.5 % (ref 0–1.8)
BASOPHILS # BLD: 0.03 K/UL (ref 0–0.12)
BILIRUB SERPL-MCNC: 0.5 MG/DL (ref 0.1–1.5)
BILIRUB SERPL-MCNC: 0.5 MG/DL (ref 0.1–1.5)
BUN SERPL-MCNC: 8 MG/DL (ref 8–22)
BUN SERPL-MCNC: 9 MG/DL (ref 8–22)
CALCIUM ALBUM COR SERPL-MCNC: 8.5 MG/DL (ref 8.5–10.5)
CALCIUM ALBUM COR SERPL-MCNC: 8.6 MG/DL (ref 8.5–10.5)
CALCIUM SERPL-MCNC: 8.6 MG/DL (ref 8.4–10.2)
CALCIUM SERPL-MCNC: 8.7 MG/DL (ref 8.4–10.2)
CHLORIDE SERPL-SCNC: 101 MMOL/L (ref 96–112)
CHLORIDE SERPL-SCNC: 101 MMOL/L (ref 96–112)
CO2 SERPL-SCNC: 22 MMOL/L (ref 20–33)
CO2 SERPL-SCNC: 23 MMOL/L (ref 20–33)
CREAT SERPL-MCNC: 0.74 MG/DL (ref 0.5–1.4)
CREAT SERPL-MCNC: 0.77 MG/DL (ref 0.5–1.4)
D DIMER PPP IA.FEU-MCNC: 0.38 UG/ML (FEU) (ref 0–0.5)
EKG IMPRESSION: NORMAL
EOSINOPHIL # BLD AUTO: 0.08 K/UL (ref 0–0.51)
EOSINOPHIL NFR BLD: 1.4 % (ref 0–6.9)
ERYTHROCYTE [DISTWIDTH] IN BLOOD BY AUTOMATED COUNT: 46.3 FL (ref 35.9–50)
FASTING STATUS PATIENT QL REPORTED: NORMAL
GFR SERPLBLD CREATININE-BSD FMLA CKD-EPI: 80 ML/MIN/1.73 M 2
GFR SERPLBLD CREATININE-BSD FMLA CKD-EPI: 83 ML/MIN/1.73 M 2
GLOBULIN SER CALC-MCNC: 2.7 G/DL (ref 1.9–3.5)
GLOBULIN SER CALC-MCNC: 2.8 G/DL (ref 1.9–3.5)
GLUCOSE SERPL-MCNC: 109 MG/DL (ref 65–99)
GLUCOSE SERPL-MCNC: 99 MG/DL (ref 65–99)
HCT VFR BLD AUTO: 37.3 % (ref 37–47)
HGB BLD-MCNC: 12.2 G/DL (ref 12–16)
IMM GRANULOCYTES # BLD AUTO: 0.03 K/UL (ref 0–0.11)
IMM GRANULOCYTES NFR BLD AUTO: 0.5 % (ref 0–0.9)
LYMPHOCYTES # BLD AUTO: 1.24 K/UL (ref 1–4.8)
LYMPHOCYTES NFR BLD: 21.9 % (ref 22–41)
MCH RBC QN AUTO: 30.1 PG (ref 27–33)
MCHC RBC AUTO-ENTMCNC: 32.7 G/DL (ref 32.2–35.5)
MCV RBC AUTO: 92.1 FL (ref 81.4–97.8)
MONOCYTES # BLD AUTO: 0.71 K/UL (ref 0–0.85)
MONOCYTES NFR BLD AUTO: 12.5 % (ref 0–13.4)
NEUTROPHILS # BLD AUTO: 3.58 K/UL (ref 1.82–7.42)
NEUTROPHILS NFR BLD: 63.2 % (ref 44–72)
NRBC # BLD AUTO: 0 K/UL
NRBC BLD-RTO: 0 /100 WBC (ref 0–0.2)
NT-PROBNP SERPL IA-MCNC: 134 PG/ML (ref 0–125)
PLATELET # BLD AUTO: 261 K/UL (ref 164–446)
PMV BLD AUTO: 8.8 FL (ref 9–12.9)
POTASSIUM SERPL-SCNC: 3.8 MMOL/L (ref 3.6–5.5)
POTASSIUM SERPL-SCNC: 3.9 MMOL/L (ref 3.6–5.5)
PROT SERPL-MCNC: 6.8 G/DL (ref 6–8.2)
PROT SERPL-MCNC: 6.9 G/DL (ref 6–8.2)
RBC # BLD AUTO: 4.05 M/UL (ref 4.2–5.4)
SODIUM SERPL-SCNC: 134 MMOL/L (ref 135–145)
SODIUM SERPL-SCNC: 134 MMOL/L (ref 135–145)
TROPONIN T SERPL-MCNC: 13 NG/L (ref 6–19)
WBC # BLD AUTO: 5.7 K/UL (ref 4.8–10.8)

## 2025-03-15 PROCEDURE — 36415 COLL VENOUS BLD VENIPUNCTURE: CPT

## 2025-03-15 PROCEDURE — 93005 ELECTROCARDIOGRAM TRACING: CPT | Mod: TC | Performed by: EMERGENCY MEDICINE

## 2025-03-15 PROCEDURE — 84484 ASSAY OF TROPONIN QUANT: CPT

## 2025-03-15 PROCEDURE — 71275 CT ANGIOGRAPHY CHEST: CPT

## 2025-03-15 PROCEDURE — 85025 COMPLETE CBC W/AUTO DIFF WBC: CPT

## 2025-03-15 PROCEDURE — 99284 EMERGENCY DEPT VISIT MOD MDM: CPT

## 2025-03-15 PROCEDURE — 85379 FIBRIN DEGRADATION QUANT: CPT

## 2025-03-15 PROCEDURE — 71045 X-RAY EXAM CHEST 1 VIEW: CPT

## 2025-03-15 PROCEDURE — 93970 EXTREMITY STUDY: CPT

## 2025-03-15 PROCEDURE — 700117 HCHG RX CONTRAST REV CODE 255: Performed by: EMERGENCY MEDICINE

## 2025-03-15 PROCEDURE — 80053 COMPREHEN METABOLIC PANEL: CPT | Mod: 91

## 2025-03-15 PROCEDURE — 83880 ASSAY OF NATRIURETIC PEPTIDE: CPT

## 2025-03-15 PROCEDURE — 80053 COMPREHEN METABOLIC PANEL: CPT

## 2025-03-15 RX ADMIN — IOHEXOL 50 ML: 350 INJECTION, SOLUTION INTRAVENOUS at 10:00

## 2025-03-15 ASSESSMENT — FIBROSIS 4 INDEX: FIB4 SCORE: 1.58

## 2025-03-15 NOTE — ED NOTES
PT and daughter verbalize understanding of discharge instructions and follow up. PT ambulates to lobby accompanied by daughter

## 2025-03-15 NOTE — DISCHARGE INSTRUCTIONS
Follow-up with your doctor.  Return to the emergency department for worsening pain, or for any new symptoms or concerns.

## 2025-03-15 NOTE — ED PROVIDER NOTES
ED Provider Note    CHIEF COMPLAINT  Chief Complaint   Patient presents with    Leg Swelling     BLE edema x 5-6 mons  Became intermittently painful x 2-3 wks Worse on palpation  No redness  Denies CP or new SOB  No fever Dry cough since Feb s/p flu       EXTERNAL RECORDS REVIEWED  Reviewed echocardiogram and stress test from March 2023.    HPI/ROS  LIMITATION TO HISTORY   Language barrier.  The patient speaks Yemeni.  The daughter is a physician would like to be the  and request that we do not use an outside translation service.  OUTSIDE HISTORIAN(S):  Daughter at bedside who is a physician.    Isha Khalil is a 74 y.o. female who presents to the emergency department for evaluation of leg pain.  The patient is brought in by her adult daughter who is a physician requesting an ultrasound of the left leg.  She is chronic edema and puffiness to both legs per the daughter but she is been having some left leg pain and concern about a DVT.  [facility] ultrasound done as an outpatient there was sent to the ED by their doctor.  States the pain is mostly around her ankle its intermittent.  States she is chronic puffiness to her legs but no significant edema.  This is not new or different.  No falls or trauma.  No fevers or chills.  She is mildly tachypneic and tachycardic but denies any chest pain or shortness of breath.  She does have a history of prior DVT remotely after travel.  Currently not on anticoagulation.  Did have a GI bleed as a result of being anticoagulate.  Also history of thyroid dysfunction.  Patient is known to be tachypneic.  Daughter states this is been chronic for the last 3 years.    PAST MEDICAL HISTORY   has a past medical history of Asthma and Hypertension.    SURGICAL HISTORY   has a past surgical history that includes appendectomy (2008); other; and other (2016).    FAMILY HISTORY  Family History   Problem Relation Age of Onset    No Known Problems Mother     No Known Problems Father   "      SOCIAL HISTORY  Social History     Tobacco Use    Smoking status: Never    Smokeless tobacco: Never   Vaping Use    Vaping status: Never Used   Substance and Sexual Activity    Alcohol use: Never    Drug use: Never    Sexual activity: Not on file     Comment: , 4 kids       CURRENT MEDICATIONS  Home Medications    **Home medications have not yet been reviewed for this encounter**       Audit from Redirected Encounters    **Home medications have not yet been reviewed for this encounter**         ALLERGIES  No Known Allergies    PHYSICAL EXAM  VITAL SIGNS: BP (!) 147/80   Pulse 100   Temp 37.3 °C (99.1 °F) (Temporal)   Resp 20   Ht 1.575 m (5' 2\")   Wt 66.8 kg (147 lb 4.3 oz)   SpO2 97%   BMI 26.94 kg/m²    Constitutional: Well developed, Well nourished, No acute distress, Non-toxic appearance.   HENT: Normocephalic, Atraumatic,   Eyes: PERRL, EOMI, Conjunctiva normal, No discharge.   Neck: Normal range of motion  Cardiovascular: Tachycardic no murmurs rubs or gallops  Thorax & Lungs: Normal breath sounds, No respiratory distress, No wheezing,  Abdomen:  Soft, No tenderness  Skin: Warm, Dry, No erythema, No rash.   Musculoskeletal: Good range of motion in all major joints.  Mild symmetric edema.  Both legs are puffy but there is no pitting edema.  Good pulses.  Good range of motion of the ankles.  Is not red or hot.  There is no reproducible tenderness on exam.  Neurologic: Alert, No focal deficits noted.       EKG/LABS  \  Results for orders placed or performed during the hospital encounter of 03/15/25   CBC with Differential    Collection Time: 03/15/25 10:40 AM   Result Value Ref Range    WBC 5.7 4.8 - 10.8 K/uL    RBC 4.05 (L) 4.20 - 5.40 M/uL    Hemoglobin 12.2 12.0 - 16.0 g/dL    Hematocrit 37.3 37.0 - 47.0 %    MCV 92.1 81.4 - 97.8 fL    MCH 30.1 27.0 - 33.0 pg    MCHC 32.7 32.2 - 35.5 g/dL    RDW 46.3 35.9 - 50.0 fL    Platelet Count 261 164 - 446 K/uL    MPV 8.8 (L) 9.0 - 12.9 fL    " Neutrophils-Polys 63.20 44.00 - 72.00 %    Lymphocytes 21.90 (L) 22.00 - 41.00 %    Monocytes 12.50 0.00 - 13.40 %    Eosinophils 1.40 0.00 - 6.90 %    Basophils 0.50 0.00 - 1.80 %    Immature Granulocytes 0.50 0.00 - 0.90 %    Nucleated RBC 0.00 0.00 - 0.20 /100 WBC    Neutrophils (Absolute) 3.58 1.82 - 7.42 K/uL    Lymphs (Absolute) 1.24 1.00 - 4.80 K/uL    Monos (Absolute) 0.71 0.00 - 0.85 K/uL    Eos (Absolute) 0.08 0.00 - 0.51 K/uL    Baso (Absolute) 0.03 0.00 - 0.12 K/uL    Immature Granulocytes (abs) 0.03 0.00 - 0.11 K/uL    NRBC (Absolute) 0.00 K/uL   Complete Metabolic Panel (CMP)    Collection Time: 03/15/25 10:40 AM   Result Value Ref Range    Sodium 134 (L) 135 - 145 mmol/L    Potassium 3.9 3.6 - 5.5 mmol/L    Chloride 101 96 - 112 mmol/L    Co2 22 20 - 33 mmol/L    Anion Gap 11.0 7.0 - 16.0    Glucose 109 (H) 65 - 99 mg/dL    Bun 9 8 - 22 mg/dL    Creatinine 0.77 0.50 - 1.40 mg/dL    Calcium 8.7 8.4 - 10.2 mg/dL    Correct Calcium 8.6 8.5 - 10.5 mg/dL    AST(SGOT) 28 12 - 45 U/L    ALT(SGPT) 29 2 - 50 U/L    Alkaline Phosphatase 67 30 - 99 U/L    Total Bilirubin 0.5 0.1 - 1.5 mg/dL    Albumin 4.1 3.2 - 4.9 g/dL    Total Protein 6.8 6.0 - 8.2 g/dL    Globulin 2.7 1.9 - 3.5 g/dL    A-G Ratio 1.5 g/dL   proBrain Natriuretic Peptide, NT (BNP)    Collection Time: 03/15/25 10:40 AM   Result Value Ref Range    NT-proBNP 134 (H) 0 - 125 pg/mL   Troponins NOW    Collection Time: 03/15/25 10:40 AM   Result Value Ref Range    Troponin T 13 6 - 19 ng/L   ESTIMATED GFR    Collection Time: 03/15/25 10:40 AM   Result Value Ref Range    GFR (CKD-EPI) 80 >60 mL/min/1.73 m 2   EKG    Collection Time: 03/15/25 11:07 AM   Result Value Ref Range    Report       [facility] Emergency Dept.    Test Date:  2025-03-15  Pt Name:    ASHANTI BETTS                     Department: [facility]  MRN:        [id number]                      [facility]  Gender:     Female                       Technician: lubna  :         [id number]                   Requested By:ER TRIAGE PROTOCOL  Order #:    [id number]                    Reading MD: BRIANNA HANSON. LIS    Measurements  Intervals                                Axis  Rate:       105                          P:          -24  AL:         159                          QRS:        35  QRSD:       83                           T:          32  QT:         361  QTc:        478    Interpretive Statements  Sinus tachycardia  No previous ECG available for comparison  Electronically Signed On 03- 11:07:45 PDT by BRIANNA HANSON. AMD        I have independently interpreted this EKG    RADIOLOGY/PROCEDURES   I have independently interpreted the diagnostic imaging associated with this visit and am waiting the final reading from the radiologist.   My preliminary interpretation is as follows: Reviewed CT and chest x-ray and agree with radiology results    Radiologist interpretation:  CT-CTA CHEST PULMONARY ARTERY W/ RECONS   Final Result      1.  No CT evidence for pulmonary emboli.   2.  No pneumonia or pneumothorax.               US-EXTREMITY VENOUS LOWER BILAT         DX-CHEST-PORTABLE (1 VIEW)   Final Result         No acute cardiac or pulmonary abnormality is identified.          COURSE & MEDICAL DECISION MAKING    ASSESSMENT, COURSE AND PLAN  Care Narrative:     74-year-old female presents to the ED with swelling in her legs and left leg discomfort with family concern for a DVT.  The patient is mildly tachycardic and mildly tachypneic which the patient's family who is a physician state is chronic.    The patient was seen and examined a broad differential diagnosis was considered including but not limited to DVT, heart failure, chronic leg edema, thyroid dysfunction, anemia, electrolyte abnormality, and pulmonary embolism as well as ACS.    Reviewed the patient's workup with labs and imaging.    Patient's main complaint is leg swelling.  The patient's daughter who is here at bedside  is a physician and provides much of the history.  She states she has chronic sort of puffiness and mild edema to her legs but is been having some calf pain and they are worried about a DVT.  The patient has noted to be mildly tachypneic and tachycardic but the daughter states this is chronic in nature.  The patient's workup and EKG which was unremarkable.  BNP is minimally elevated troponin is normal.  The absence of any chest pain I do not think she requires any further cardiac workup in the emergency department today.  She does have a history of prior PE therefore chest CT for PE was obtained this is negative for pulmonary embolism.    No evidence of infiltrate.  She is not anemic.  No signs of renal failure or significant lecture light abnormality.  DVT study is negative per the tech read or pending the radiologist formal read.    This point the patient stable for continued outpatient workup.  The patient has had an echocardiogram in September and 2023.  She may need a repeat echo but is coming does not outpatient does not have signs of acute heart failure and acute coronary syndrome.    I think of her DVT study is negative she can be discharged with return precautions and follow-up and follow-up with her primary care doctor.  The patient and daughter comfortable this plan.  Questions were answered.      \          ADDITIONAL PROBLEMS MANAGED  History of prior PE  History of thyroid dysfunction    DISPOSITION AND DISCUSSIONS      /Linda [place] M.D.  [address] [place] [address] [place] [id number] [phone]              FINAL DIAGNOSIS  1. Peripheral edema         Electronically signed by: Kwasi Mota M.D., 3/15/2025 11:02 AM

## 2025-03-17 ENCOUNTER — RESULTS FOLLOW-UP (OUTPATIENT)
Dept: MEDICAL GROUP | Age: 75
End: 2025-03-17
Payer: MEDICARE

## 2025-03-18 DIAGNOSIS — I51.7 MILD CONCENTRIC LEFT VENTRICULAR HYPERTROPHY: ICD-10-CM

## 2025-03-18 DIAGNOSIS — R06.00 DYSPNEA, UNSPECIFIED TYPE: ICD-10-CM

## 2025-03-18 DIAGNOSIS — I10 BENIGN ESSENTIAL HTN: ICD-10-CM

## 2025-03-18 NOTE — PROGRESS NOTES
Problem List Items Addressed This Visit       Benign essential HTN    Relevant Orders    EC-ECHOCARDIOGRAM COMPLETE W/O CONT     Other Visit Diagnoses         Mild concentric left ventricular hypertrophy        Relevant Orders    EC-ECHOCARDIOGRAM COMPLETE W/O CONT      Dyspnea, unspecified type        Relevant Orders    EC-ECHOCARDIOGRAM COMPLETE W/O CONT

## 2025-03-19 ENCOUNTER — ANCILLARY PROCEDURE (OUTPATIENT)
Dept: CARDIOLOGY | Facility: MEDICAL CENTER | Age: 75
End: 2025-03-19
Attending: INTERNAL MEDICINE
Payer: MEDICARE

## 2025-03-19 ENCOUNTER — RESULTS FOLLOW-UP (OUTPATIENT)
Dept: MEDICAL GROUP | Age: 75
End: 2025-03-19

## 2025-03-19 ENCOUNTER — APPOINTMENT (OUTPATIENT)
Dept: MEDICAL GROUP | Age: 75
End: 2025-03-19
Payer: MEDICARE

## 2025-03-19 ENCOUNTER — OFFICE VISIT (OUTPATIENT)
Dept: MEDICAL GROUP | Age: 75
End: 2025-03-19
Payer: MEDICARE

## 2025-03-19 VITALS
BODY MASS INDEX: 27.05 KG/M2 | DIASTOLIC BLOOD PRESSURE: 70 MMHG | WEIGHT: 147 LBS | TEMPERATURE: 98 F | SYSTOLIC BLOOD PRESSURE: 136 MMHG | HEIGHT: 62 IN | HEART RATE: 76 BPM | OXYGEN SATURATION: 97 % | RESPIRATION RATE: 16 BRPM

## 2025-03-19 DIAGNOSIS — R06.00 DYSPNEA, UNSPECIFIED TYPE: ICD-10-CM

## 2025-03-19 DIAGNOSIS — E87.1 HYPONATREMIA: ICD-10-CM

## 2025-03-19 DIAGNOSIS — I10 BENIGN ESSENTIAL HTN: ICD-10-CM

## 2025-03-19 DIAGNOSIS — I51.7 MILD CONCENTRIC LEFT VENTRICULAR HYPERTROPHY: ICD-10-CM

## 2025-03-19 DIAGNOSIS — I77.810 ASCENDING AORTA DILATATION (HCC): ICD-10-CM

## 2025-03-19 LAB
LV EJECT FRACT MOD 2C 99903: 60.68
LV EJECT FRACT MOD 4C 99902: 57.66
LV EJECT FRACT MOD BP 99901: 59.58

## 2025-03-19 PROCEDURE — 3075F SYST BP GE 130 - 139MM HG: CPT | Performed by: INTERNAL MEDICINE

## 2025-03-19 PROCEDURE — 3078F DIAST BP <80 MM HG: CPT | Performed by: INTERNAL MEDICINE

## 2025-03-19 PROCEDURE — 93306 TTE W/DOPPLER COMPLETE: CPT

## 2025-03-19 PROCEDURE — 99214 OFFICE O/P EST MOD 30 MIN: CPT | Performed by: INTERNAL MEDICINE

## 2025-03-19 ASSESSMENT — ENCOUNTER SYMPTOMS: SHORTNESS OF BREATH: 1

## 2025-03-19 ASSESSMENT — FIBROSIS 4 INDEX: FIB4 SCORE: 1.51

## 2025-03-19 NOTE — PROGRESS NOTES
CC:   Chief Complaint   Patient presents with    [facility]     Diagnoses of Ascending aorta dilatation (HCC), Dyspnea, unspecified type, and Hyponatremia were pertinent to this visit.  Verbal consent was acquired by the patient to use [employer] ambient listening note generation during this visit Yes       History of Present Illness  Isha is a pleasant 74 y.o. female who presents for evaluation of hyponatremia, leg swelling, and elevated heart rate. She is accompanied by her daughter, who is providing translation from [employer].  Official  services were offered and declined by the patient.    She has been experiencing persistent leg swelling for the past 5 to 6 months, which she reports as being unusually severe. She also reports pain around the ankle joint and lower leg. She has not yet tried using compression socks but is open to the suggestion.    She is currently on a regimen of sodium tablets due to a diagnosis of hyponatremia, which required hospitalization. Her current dosage is 1 g daily, a reduction from her previous dosage of 2 g daily. Her sodium levels were previously recorded as 136 and 137 when she was on the higher dosage. Her most recent sodium level was 134 while on the reduced dosage. She maintains a high fluid intake, consuming more than 1 liter of water daily.    During her initial visit to the emergency room, she presented with an elevated heart rate, prompting a CT scan to rule out pulmonary embolism. Following the scan, her heart rate decreased to between 70 and 80 beats per minute. She does not require any medication refills at this time.    Past Medical History:   Diagnosis Date    Asthma     Hypertension        Current Outpatient Medications Ordered in Epic   Medication Sig Dispense Refill    benzonatate (TESSALON) 100 MG Cap Take 1 Capsule by mouth 3 times a day as needed for Cough. 60 Capsule 0    sodium chloride (SALT) 1 GM Tab Take 1  "Tablet by mouth 2 times a day. 180 Tablet 1    fluticasone-salmeterol (ADVAIR) 100-50 MCG/ACT AEROSOL POWDER, BREATH ACTIVATED Inhale 1 Puff every 12 hours. 180 Each 3    levothyroxine (SYNTHROID) 50 MCG Tab Take orally. 1 tab daily from Monday to Friday. 2 tabs daily on Sat and Sun. 110 Tablet 3    losartan (COZAAR) 25 MG Tab Take 1 Tablet by mouth every evening. 90 Tablet 3    metoprolol SR (TOPROL XL) 25 MG TABLET SR 24 HR Take 1 Tablet by mouth every day. 90 Tablet 3    ofloxacin (OCUFLOX) 0.3 % Solution Administer 1 Drop into the left eye 2 times a day. 10 mL 3    rosuvastatin (CRESTOR) 5 MG Tab Take 1 Tablet by mouth every evening. 90 Tablet 3    PROLIA 60 MG/ML Solution Prefilled Syringe injection       vitamin D2, Ergocalciferol, (DRISDOL) 1.25 MG (66372 UT) Cap capsule TAKE 1 CAPSULE BY MOUTH ONE TIME PER WEEK 12 Capsule 3    Calcium-Magnesium-Vitamin D (CALCIUM 1200+D3 PO) Take  by mouth.      aspirin 81 MG EC tablet Take 81 mg by mouth every day. (Patient not taking: Reported on 3/19/2025)       No current [facility]-ordered facility-administered medications on file.     Review of Systems   Respiratory:  Positive for shortness of breath (occ).    Cardiovascular:  Negative for chest pain.     Objective:     Exam:  /70 (BP Location: Left arm, Patient Position: Sitting, BP Cuff Size: Adult)   Pulse 76   Temp 36.7 °C (98 °F) (Temporal)   Resp 16   Ht 1.575 m (5' 2\")   Wt 66.7 kg (147 lb)   SpO2 97%   BMI 26.89 kg/m²  Body mass index is 26.89 kg/m².    Physical Exam  Constitutional:       Appearance: Normal appearance.   HENT:      Head: Normocephalic and atraumatic.   Cardiovascular:      Pulses: Normal pulses.      Heart sounds: Normal heart sounds. No murmur heard.  Pulmonary:      Effort: Pulmonary effort is normal. No respiratory distress.      Breath sounds: Normal breath sounds. No wheezing.   Musculoskeletal:      Right lower leg: Edema present.      Left lower leg: Edema present. "   Neurological:      Mental Status: She is alert.       Results: reviewed and interpreted results:   Latest Reference Range & Units 03/15/25 10:40   WBC 4.8 - 10.8 K/uL 5.7   RBC 4.20 - 5.40 M/uL 4.05 (L)   Hemoglobin 12.0 - 16.0 g/dL 12.2   Hematocrit 37.0 - 47.0 % 37.3   MCV 81.4 - 97.8 fL 92.1   MCH 27.0 - 33.0 pg 30.1   MCHC 32.2 - 35.5 g/dL 32.7   RDW 35.9 - 50.0 fL 46.3   Platelet Count 164 - 446 K/uL 261   MPV 9.0 - 12.9 fL 8.8 (L)   Neutrophils-Polys 44.00 - 72.00 % 63.20   Neutrophils (Absolute) 1.82 - 7.42 K/uL 3.58   Lymphocytes 22.00 - 41.00 % 21.90 (L)   Lymphs (Absolute) 1.00 - 4.80 K/uL 1.24   Monocytes 0.00 - 13.40 % 12.50   Monos (Absolute) 0.00 - 0.85 K/uL 0.71   Eosinophils 0.00 - 6.90 % 1.40   Eos (Absolute) 0.00 - 0.51 K/uL 0.08   Basophils 0.00 - 1.80 % 0.50   Baso (Absolute) 0.00 - 0.12 K/uL 0.03   Immature Granulocytes 0.00 - 0.90 % 0.50   Immature Granulocytes (abs) 0.00 - 0.11 K/uL 0.03   Nucleated RBC 0.00 - 0.20 /100 WBC 0.00   NRBC (Absolute) K/uL 0.00   Sodium 135 - 145 mmol/L 134 (L)   Potassium 3.6 - 5.5 mmol/L 3.9   Chloride 96 - 112 mmol/L 101   Co2 20 - 33 mmol/L 22   Anion Gap 7.0 - 16.0  11.0   Glucose 65 - 99 mg/dL 109 (H)   Bun 8 - 22 mg/dL 9   Creatinine 0.50 - 1.40 mg/dL 0.77   GFR (CKD-EPI) >60 mL/min/1.73 m 2 80   Calcium 8.4 - 10.2 mg/dL 8.7   Correct Calcium 8.5 - 10.5 mg/dL 8.6   AST(SGOT) 12 - 45 U/L 28   ALT(SGPT) 2 - 50 U/L 29   Alkaline Phosphatase 30 - 99 U/L 67   Total Bilirubin 0.1 - 1.5 mg/dL 0.5   Albumin 3.2 - 4.9 g/dL 4.1   Total Protein 6.0 - 8.2 g/dL 6.8   Globulin 1.9 - 3.5 g/dL 2.7   A-G Ratio g/dL 1.5   Troponin T 6 - 19 ng/L 13   NT-proBNP 0 - 125 pg/mL 134 (H)   (L): Data is abnormally low  (H): Data is abnormally high  Results  Laboratory Studies  Sodium was 134. ProBNP was slightly elevated.    Imaging  Echocardiogram showed normal left ventricle, ejection fraction 60%, normal diastolic function, normal sizes of right ventricle, RVSP is 26, ascending  aorta is mildly dilated with diameter of 3.8 cm.    Assessment & Plan:   Isha  is a pleasant 74 y.o. female with the following -   Assessment & Plan  1. Hyponatremia.  Her sodium level was noted to be 134, which is slightly low. She is currently taking 1 g of sodium tablets once a day. Previously, she was taking 2 tablets when her sodium levels were 136 and 137. She will continue with the current dosage of 1 g daily. She will have her sodium levels checked again this Saturday to monitor the effectiveness of the current dosage.    2. Venous insufficiency.  She has been experiencing leg swelling for the past 5 to 6 months, which worsened after hospitalization. An ultrasound confirmed the absence of blood clots. She is advised to start wearing compression socks and keep her feet elevated to manage the swelling.    3. Aortic dilatation.  The echocardiogram revealed that the ascending aorta is mildly dilated with a diameter of 3.8 cm, compared to a previous measurement of 3.1 cm. A referral to cardiology has been initiated for further evaluation. She will receive a message on [employer] once the referral is approved, which typically takes about 7 days. She will then need to call and set up her visit.    Problem List Items Addressed This Visit       Ascending aorta dilatation (HCC)    Relevant Orders    REFERRAL TO CARDIOLOGY    Hyponatremia     Other Visit Diagnoses         Dyspnea, unspecified type        Relevant Orders    REFERRAL TO CARDIOLOGY          Follow-up  The patient will follow up in July 2025    Please note that this dictation was created using voice recognition software. I have made every reasonable attempt to correct obvious errors, but I expect that there are errors of grammar and possibly content that I did not discover before finalizing the note.

## 2025-03-22 ENCOUNTER — HOSPITAL ENCOUNTER (OUTPATIENT)
Facility: MEDICAL CENTER | Age: 75
End: 2025-03-22
Attending: INTERNAL MEDICINE
Payer: MEDICARE

## 2025-03-22 DIAGNOSIS — E87.1 HYPONATREMIA: ICD-10-CM

## 2025-03-22 DIAGNOSIS — J45.40 MODERATE PERSISTENT ASTHMA WITHOUT COMPLICATION: ICD-10-CM

## 2025-03-22 LAB
ALBUMIN SERPL BCP-MCNC: 3.9 G/DL (ref 3.2–4.9)
ALBUMIN/GLOB SERPL: 1.4 G/DL
ALP SERPL-CCNC: 62 U/L (ref 30–99)
ALT SERPL-CCNC: 23 U/L (ref 2–50)
ANION GAP SERPL CALC-SCNC: 11 MMOL/L (ref 7–16)
AST SERPL-CCNC: 25 U/L (ref 12–45)
BILIRUB SERPL-MCNC: 0.5 MG/DL (ref 0.1–1.5)
BUN SERPL-MCNC: 9 MG/DL (ref 8–22)
CALCIUM ALBUM COR SERPL-MCNC: 8.9 MG/DL (ref 8.5–10.5)
CALCIUM SERPL-MCNC: 8.8 MG/DL (ref 8.4–10.2)
CHLORIDE SERPL-SCNC: 100 MMOL/L (ref 96–112)
CO2 SERPL-SCNC: 24 MMOL/L (ref 20–33)
CREAT SERPL-MCNC: 0.65 MG/DL (ref 0.5–1.4)
FASTING STATUS PATIENT QL REPORTED: NORMAL
GFR SERPLBLD CREATININE-BSD FMLA CKD-EPI: 91 ML/MIN/1.73 M 2
GLOBULIN SER CALC-MCNC: 2.7 G/DL (ref 1.9–3.5)
GLUCOSE SERPL-MCNC: 109 MG/DL (ref 65–99)
POTASSIUM SERPL-SCNC: 3.8 MMOL/L (ref 3.6–5.5)
PROT SERPL-MCNC: 6.6 G/DL (ref 6–8.2)
SODIUM SERPL-SCNC: 135 MMOL/L (ref 135–145)

## 2025-03-22 PROCEDURE — 36415 COLL VENOUS BLD VENIPUNCTURE: CPT

## 2025-03-22 PROCEDURE — 80053 COMPREHEN METABOLIC PANEL: CPT

## 2025-03-24 ENCOUNTER — RESULTS FOLLOW-UP (OUTPATIENT)
Dept: MEDICAL GROUP | Age: 75
End: 2025-03-24
Payer: MEDICARE

## 2025-03-24 RX ORDER — FLUTICASONE PROPIONATE AND SALMETEROL 100; 50 UG/1; UG/1
1 POWDER RESPIRATORY (INHALATION) EVERY 12 HOURS
Qty: 180 EACH | Refills: 3 | Status: SHIPPED | OUTPATIENT
Start: 2025-03-24

## 2025-03-26 ENCOUNTER — PATIENT MESSAGE (OUTPATIENT)
Dept: MEDICAL GROUP | Age: 75
End: 2025-03-26
Payer: MEDICARE

## 2025-03-26 DIAGNOSIS — I10 BENIGN ESSENTIAL HTN: ICD-10-CM

## 2025-03-26 DIAGNOSIS — R73.9 HYPERGLYCEMIA: ICD-10-CM

## 2025-03-26 DIAGNOSIS — E53.8 VITAMIN B12 DEFICIENCY: ICD-10-CM

## 2025-03-29 ENCOUNTER — HOSPITAL ENCOUNTER (OUTPATIENT)
Facility: MEDICAL CENTER | Age: 75
End: 2025-03-29
Attending: INTERNAL MEDICINE
Payer: MEDICARE

## 2025-03-29 DIAGNOSIS — I10 BENIGN ESSENTIAL HTN: ICD-10-CM

## 2025-03-29 DIAGNOSIS — R73.9 HYPERGLYCEMIA: ICD-10-CM

## 2025-03-29 DIAGNOSIS — E78.5 DYSLIPIDEMIA: ICD-10-CM

## 2025-03-29 DIAGNOSIS — E55.9 VITAMIN D DEFICIENCY: ICD-10-CM

## 2025-03-29 DIAGNOSIS — E53.8 VITAMIN B12 DEFICIENCY: ICD-10-CM

## 2025-03-29 DIAGNOSIS — E06.3 HYPOTHYROIDISM DUE TO HASHIMOTO'S THYROIDITIS: ICD-10-CM

## 2025-03-29 DIAGNOSIS — E83.51 HYPOCALCEMIA: ICD-10-CM

## 2025-03-29 LAB
25(OH)D3 SERPL-MCNC: 61 NG/ML (ref 30–100)
ALBUMIN SERPL BCP-MCNC: 4.2 G/DL (ref 3.2–4.9)
ALBUMIN/GLOB SERPL: 1.4 G/DL
ALP SERPL-CCNC: 60 U/L (ref 30–99)
ALT SERPL-CCNC: 27 U/L (ref 2–50)
ANION GAP SERPL CALC-SCNC: 11 MMOL/L (ref 7–16)
AST SERPL-CCNC: 24 U/L (ref 12–45)
BASOPHILS # BLD AUTO: 0.6 % (ref 0–1.8)
BASOPHILS # BLD: 0.03 K/UL (ref 0–0.12)
BILIRUB SERPL-MCNC: 0.6 MG/DL (ref 0.1–1.5)
BUN SERPL-MCNC: 7 MG/DL (ref 8–22)
CALCIUM ALBUM COR SERPL-MCNC: 8.7 MG/DL (ref 8.5–10.5)
CALCIUM SERPL-MCNC: 8.9 MG/DL (ref 8.5–10.5)
CHLORIDE SERPL-SCNC: 104 MMOL/L (ref 96–112)
CHOLEST SERPL-MCNC: 128 MG/DL (ref 100–199)
CO2 SERPL-SCNC: 26 MMOL/L (ref 20–33)
CREAT SERPL-MCNC: 0.67 MG/DL (ref 0.5–1.4)
CREAT UR-MCNC: 146 MG/DL
EOSINOPHIL # BLD AUTO: 0.05 K/UL (ref 0–0.51)
EOSINOPHIL NFR BLD: 1 % (ref 0–6.9)
ERYTHROCYTE [DISTWIDTH] IN BLOOD BY AUTOMATED COUNT: 47.5 FL (ref 35.9–50)
EST. AVERAGE GLUCOSE BLD GHB EST-MCNC: 131 MG/DL
FASTING STATUS PATIENT QL REPORTED: NORMAL
GFR SERPLBLD CREATININE-BSD FMLA CKD-EPI: 90 ML/MIN/1.73 M 2
GLOBULIN SER CALC-MCNC: 3 G/DL (ref 1.9–3.5)
GLUCOSE SERPL-MCNC: 98 MG/DL (ref 65–99)
HBA1C MFR BLD: 6.2 % (ref 4–5.6)
HCT VFR BLD AUTO: 37.7 % (ref 37–47)
HDLC SERPL-MCNC: 62 MG/DL
HGB BLD-MCNC: 12.3 G/DL (ref 12–16)
IMM GRANULOCYTES # BLD AUTO: 0.02 K/UL (ref 0–0.11)
IMM GRANULOCYTES NFR BLD AUTO: 0.4 % (ref 0–0.9)
LDLC SERPL CALC-MCNC: 51 MG/DL
LYMPHOCYTES # BLD AUTO: 1.49 K/UL (ref 1–4.8)
LYMPHOCYTES NFR BLD: 30.5 % (ref 22–41)
MCH RBC QN AUTO: 30.3 PG (ref 27–33)
MCHC RBC AUTO-ENTMCNC: 32.6 G/DL (ref 32.2–35.5)
MCV RBC AUTO: 92.9 FL (ref 81.4–97.8)
MICROALBUMIN UR-MCNC: 1.7 MG/DL
MICROALBUMIN/CREAT UR: 12 MG/G (ref 0–30)
MONOCYTES # BLD AUTO: 0.5 K/UL (ref 0–0.85)
MONOCYTES NFR BLD AUTO: 10.2 % (ref 0–13.4)
NEUTROPHILS # BLD AUTO: 2.8 K/UL (ref 1.82–7.42)
NEUTROPHILS NFR BLD: 57.3 % (ref 44–72)
NRBC # BLD AUTO: 0 K/UL
NRBC BLD-RTO: 0 /100 WBC (ref 0–0.2)
PLATELET # BLD AUTO: 253 K/UL (ref 164–446)
PMV BLD AUTO: 9.3 FL (ref 9–12.9)
POTASSIUM SERPL-SCNC: 3.6 MMOL/L (ref 3.6–5.5)
PROT SERPL-MCNC: 7.2 G/DL (ref 6–8.2)
RBC # BLD AUTO: 4.06 M/UL (ref 4.2–5.4)
SODIUM SERPL-SCNC: 141 MMOL/L (ref 135–145)
TRIGL SERPL-MCNC: 75 MG/DL (ref 0–149)
TSH SERPL DL<=0.005 MIU/L-ACNC: 3.96 UIU/ML (ref 0.38–5.33)
VIT B12 SERPL-MCNC: 550 PG/ML (ref 211–911)
WBC # BLD AUTO: 4.9 K/UL (ref 4.8–10.8)

## 2025-03-29 PROCEDURE — 82043 UR ALBUMIN QUANTITATIVE: CPT

## 2025-03-29 PROCEDURE — 82306 VITAMIN D 25 HYDROXY: CPT

## 2025-03-29 PROCEDURE — 80061 LIPID PANEL: CPT

## 2025-03-29 PROCEDURE — 83036 HEMOGLOBIN GLYCOSYLATED A1C: CPT | Mod: GA

## 2025-03-29 PROCEDURE — 36415 COLL VENOUS BLD VENIPUNCTURE: CPT | Mod: GA

## 2025-03-29 PROCEDURE — 84443 ASSAY THYROID STIM HORMONE: CPT

## 2025-03-29 PROCEDURE — 80053 COMPREHEN METABOLIC PANEL: CPT

## 2025-03-29 PROCEDURE — 82570 ASSAY OF URINE CREATININE: CPT

## 2025-03-29 PROCEDURE — 82607 VITAMIN B-12: CPT

## 2025-03-29 PROCEDURE — 85025 COMPLETE CBC W/AUTO DIFF WBC: CPT

## 2025-03-31 ENCOUNTER — RESULTS FOLLOW-UP (OUTPATIENT)
Dept: MEDICAL GROUP | Age: 75
End: 2025-03-31
Payer: MEDICARE

## 2025-04-01 ENCOUNTER — TELEPHONE (OUTPATIENT)
Dept: HEALTH INFORMATION MANAGEMENT | Facility: OTHER | Age: 75
End: 2025-04-01
Payer: MEDICARE

## 2025-04-03 ENCOUNTER — PATIENT MESSAGE (OUTPATIENT)
Dept: SCHEDULING | Facility: IMAGING CENTER | Age: 75
End: 2025-04-03
Payer: MEDICARE

## 2025-04-12 ENCOUNTER — HOSPITAL ENCOUNTER (OUTPATIENT)
Facility: MEDICAL CENTER | Age: 75
End: 2025-04-12
Attending: INTERNAL MEDICINE
Payer: MEDICARE

## 2025-04-12 DIAGNOSIS — E87.1 HYPONATREMIA: ICD-10-CM

## 2025-04-12 LAB
ALBUMIN SERPL BCP-MCNC: 4.1 G/DL (ref 3.2–4.9)
ALBUMIN/GLOB SERPL: 1.5 G/DL
ALP SERPL-CCNC: 61 U/L (ref 30–99)
ALT SERPL-CCNC: 28 U/L (ref 2–50)
ANION GAP SERPL CALC-SCNC: 11 MMOL/L (ref 7–16)
AST SERPL-CCNC: 26 U/L (ref 12–45)
BILIRUB SERPL-MCNC: 0.6 MG/DL (ref 0.1–1.5)
BUN SERPL-MCNC: 8 MG/DL (ref 8–22)
CALCIUM ALBUM COR SERPL-MCNC: 8.9 MG/DL (ref 8.5–10.5)
CALCIUM SERPL-MCNC: 9 MG/DL (ref 8.5–10.5)
CHLORIDE SERPL-SCNC: 103 MMOL/L (ref 96–112)
CO2 SERPL-SCNC: 24 MMOL/L (ref 20–33)
CREAT SERPL-MCNC: 0.66 MG/DL (ref 0.5–1.4)
FASTING STATUS PATIENT QL REPORTED: NORMAL
GFR SERPLBLD CREATININE-BSD FMLA CKD-EPI: 90 ML/MIN/1.73 M 2
GLOBULIN SER CALC-MCNC: 2.8 G/DL (ref 1.9–3.5)
GLUCOSE SERPL-MCNC: 92 MG/DL (ref 65–99)
POTASSIUM SERPL-SCNC: 4 MMOL/L (ref 3.6–5.5)
PROT SERPL-MCNC: 6.9 G/DL (ref 6–8.2)
SODIUM SERPL-SCNC: 138 MMOL/L (ref 135–145)

## 2025-04-12 PROCEDURE — 80053 COMPREHEN METABOLIC PANEL: CPT

## 2025-04-12 PROCEDURE — 36415 COLL VENOUS BLD VENIPUNCTURE: CPT

## 2025-04-14 ENCOUNTER — RESULTS FOLLOW-UP (OUTPATIENT)
Dept: MEDICAL GROUP | Age: 75
End: 2025-04-14
Payer: MEDICARE

## 2025-05-27 ENCOUNTER — OFFICE VISIT (OUTPATIENT)
Dept: CARDIOLOGY | Facility: MEDICAL CENTER | Age: 75
End: 2025-05-27
Attending: INTERNAL MEDICINE
Payer: MEDICARE

## 2025-05-27 VITALS
OXYGEN SATURATION: 96 % | WEIGHT: 140 LBS | HEART RATE: 73 BPM | HEIGHT: 62 IN | SYSTOLIC BLOOD PRESSURE: 110 MMHG | BODY MASS INDEX: 25.76 KG/M2 | DIASTOLIC BLOOD PRESSURE: 68 MMHG | RESPIRATION RATE: 16 BRPM

## 2025-05-27 DIAGNOSIS — I77.810 ASCENDING AORTA DILATATION (HCC): Primary | ICD-10-CM

## 2025-05-27 DIAGNOSIS — I10 BENIGN ESSENTIAL HTN: ICD-10-CM

## 2025-05-27 DIAGNOSIS — E78.5 DYSLIPIDEMIA: ICD-10-CM

## 2025-05-27 PROCEDURE — 99212 OFFICE O/P EST SF 10 MIN: CPT | Performed by: INTERNAL MEDICINE

## 2025-05-27 ASSESSMENT — FIBROSIS 4 INDEX: FIB4 SCORE: 1.48

## 2025-05-27 NOTE — PROGRESS NOTES
"Chief Complaint   Patient presents with    Hypertension    New Patient       Subjective     Isha Khalil is a 74 y.o. female who presents today  in consultation from Linda Calhoun M.D. for evaluation of with TAE on echo, HTN dyslipidemia and CT h/o PE after long flight    Recently had CP and CTA showed     Perhaps CTA in 2019 had PE and CTA said 41 mm TAE    Here with dtr who [facility]     Past Medical History[1]  Past Surgical History[2]  Family History   Problem Relation Age of Onset    No Known Problems Mother     No Known Problems Father      Social History     Socioeconomic History    Marital status:      Spouse name: Not on file    Number of children: Not on file    Years of education: Not on file    Highest education level: Not on file   Occupational History    Not on file   Tobacco Use    Smoking status: Never    Smokeless tobacco: Never   Vaping Use    Vaping status: Never Used   Substance and Sexual Activity    Alcohol use: Never    Drug use: Never    Sexual activity: Not on file     Comment: , 4 kids   Other Topics Concern    Not on file   Social History Narrative    Not on file     Social Drivers of Health     Financial Resource Strain: Not on file   Food Insecurity: Not on file   Transportation Needs: Not on file   Physical Activity: Not on file   Stress: Not on file   Social Connections: Not on file   Intimate Partner Violence: Not on file   Housing Stability: Not on file     Allergies[3]  Encounter Medications[4]  ROS           Objective     /68 (BP Location: Left arm, Patient Position: Sitting, BP Cuff Size: Adult)   Pulse 73   Resp 16   Ht 1.575 m (5' 2\")   Wt 63.5 kg (140 lb)   SpO2 96%   BMI 25.61 kg/m²     Physical Exam  Constitutional:       General: She is not in acute distress.     Appearance: She is not diaphoretic.   Eyes:      General: No scleral icterus.  Neck:      Vascular: No JVD.   Cardiovascular:      Rate and Rhythm: Normal rate.      " Heart sounds: Normal heart sounds. No murmur heard.     No friction rub. No gallop.   Pulmonary:      Effort: No respiratory distress.      Breath sounds: No wheezing or rales.   Abdominal:      General: Bowel sounds are normal.      Palpations: Abdomen is soft.   Musculoskeletal:      Right lower leg: No edema.      Left lower leg: No edema.   Skin:     Findings: No rash.   Neurological:      Mental Status: She is alert. Mental status is at baseline.   Psychiatric:         Mood and Affect: Mood normal.          We reviewed in person the most recent labs  Recent Results (from the past 30 weeks)   POCT ionized CA device results    Collection Time: 01/22/25  8:34 AM   Result Value Ref Range    Istat Ionized Calcium 1.16 1.10 - 1.30 mmol/L   POCT creatinine device results    Collection Time: 01/22/25  8:34 AM   Result Value Ref Range    Istat Creatinine 0.7 0.5 - 1.4 mg/dL   COMP METABOLIC PANEL    Collection Time: 02/21/25  6:22 AM   Result Value Ref Range    Glucose 97 70 - 99 mg/dL    Bun 12 8 - 27 mg/dL    Creatinine 0.67 0.57 - 1.00 mg/dL    eGFR 91 >59 mL/min/1.73    Bun-Creatinine Ratio 18 12 - 28    Sodium 133 (L) 134 - 144 mmol/L    Potassium 4.8 3.5 - 5.2 mmol/L    Chloride 97 96 - 106 mmol/L    Co2 24 20 - 29 mmol/L    Calcium 8.6 (L) 8.7 - 10.3 mg/dL    Total Protein 6.3 6.0 - 8.5 g/dL    Albumin 3.9 3.8 - 4.8 g/dL    Globulin 2.4 1.5 - 4.5 g/dL    Total Bilirubin 0.6 0.0 - 1.2 mg/dL    Alkaline Phosphatase 73 44 - 121 IU/L    AST(SGOT) 22 0 - 40 IU/L    ALT(SGPT) 31 0 - 32 IU/L   COMP METABOLIC PANEL    Collection Time: 03/07/25  5:24 AM   Result Value Ref Range    Glucose 101 (H) 70 - 99 mg/dL    Bun 13 8 - 27 mg/dL    Creatinine 0.68 0.57 - 1.00 mg/dL    eGFR 90 >59 mL/min/1.73    Bun-Creatinine Ratio 19 12 - 28    Sodium 137 134 - 144 mmol/L    Potassium 4.9 3.5 - 5.2 mmol/L    Chloride 101 96 - 106 mmol/L    Co2 24 20 - 29 mmol/L    Calcium 8.7 8.7 - 10.3 mg/dL    Total Protein 6.6 6.0 - 8.5 g/dL     Albumin 3.9 3.8 - 4.8 g/dL    Globulin 2.7 1.5 - 4.5 g/dL    Total Bilirubin 0.5 0.0 - 1.2 mg/dL    Alkaline Phosphatase 69 44 - 121 IU/L    AST(SGOT) 30 0 - 40 IU/L    ALT(SGPT) 39 (H) 0 - 32 IU/L   Comp Metabolic Panel    Collection Time: 03/15/25 10:17 AM   Result Value Ref Range    Sodium 134 (L) 135 - 145 mmol/L    Potassium 3.8 3.6 - 5.5 mmol/L    Chloride 101 96 - 112 mmol/L    Co2 23 20 - 33 mmol/L    Anion Gap 10.0 7.0 - 16.0    Glucose 99 65 - 99 mg/dL    Bun 8 8 - 22 mg/dL    Creatinine 0.74 0.50 - 1.40 mg/dL    Calcium 8.6 8.4 - 10.2 mg/dL    Correct Calcium 8.5 8.5 - 10.5 mg/dL    AST(SGOT) 27 12 - 45 U/L    ALT(SGPT) 29 2 - 50 U/L    Alkaline Phosphatase 66 30 - 99 U/L    Total Bilirubin 0.5 0.1 - 1.5 mg/dL    Albumin 4.1 3.2 - 4.9 g/dL    Total Protein 6.9 6.0 - 8.2 g/dL    Globulin 2.8 1.9 - 3.5 g/dL    A-G Ratio 1.5 g/dL   FASTING STATUS    Collection Time: 03/15/25 10:17 AM   Result Value Ref Range    Fasting Status Non-Fasting    ESTIMATED GFR    Collection Time: 03/15/25 10:17 AM   Result Value Ref Range    GFR (CKD-EPI) 83 >60 mL/min/1.73 m 2   CBC with Differential    Collection Time: 03/15/25 10:40 AM   Result Value Ref Range    WBC 5.7 4.8 - 10.8 K/uL    RBC 4.05 (L) 4.20 - 5.40 M/uL    Hemoglobin 12.2 12.0 - 16.0 g/dL    Hematocrit 37.3 37.0 - 47.0 %    MCV 92.1 81.4 - 97.8 fL    MCH 30.1 27.0 - 33.0 pg    MCHC 32.7 32.2 - 35.5 g/dL    RDW 46.3 35.9 - 50.0 fL    Platelet Count 261 164 - 446 K/uL    MPV 8.8 (L) 9.0 - 12.9 fL    Neutrophils-Polys 63.20 44.00 - 72.00 %    Lymphocytes 21.90 (L) 22.00 - 41.00 %    Monocytes 12.50 0.00 - 13.40 %    Eosinophils 1.40 0.00 - 6.90 %    Basophils 0.50 0.00 - 1.80 %    Immature Granulocytes 0.50 0.00 - 0.90 %    Nucleated RBC 0.00 0.00 - 0.20 /100 WBC    Neutrophils (Absolute) 3.58 1.82 - 7.42 K/uL    Lymphs (Absolute) 1.24 1.00 - 4.80 K/uL    Monos (Absolute) 0.71 0.00 - 0.85 K/uL    Eos (Absolute) 0.08 0.00 - 0.51 K/uL    Baso (Absolute) 0.03 0.00 -  0.12 K/uL    Immature Granulocytes (abs) 0.03 0.00 - 0.11 K/uL    NRBC (Absolute) 0.00 K/uL   Complete Metabolic Panel (CMP)    Collection Time: 03/15/25 10:40 AM   Result Value Ref Range    Sodium 134 (L) 135 - 145 mmol/L    Potassium 3.9 3.6 - 5.5 mmol/L    Chloride 101 96 - 112 mmol/L    Co2 22 20 - 33 mmol/L    Anion Gap 11.0 7.0 - 16.0    Glucose 109 (H) 65 - 99 mg/dL    Bun 9 8 - 22 mg/dL    Creatinine 0.77 0.50 - 1.40 mg/dL    Calcium 8.7 8.4 - 10.2 mg/dL    Correct Calcium 8.6 8.5 - 10.5 mg/dL    AST(SGOT) 28 12 - 45 U/L    ALT(SGPT) 29 2 - 50 U/L    Alkaline Phosphatase 67 30 - 99 U/L    Total Bilirubin 0.5 0.1 - 1.5 mg/dL    Albumin 4.1 3.2 - 4.9 g/dL    Total Protein 6.8 6.0 - 8.2 g/dL    Globulin 2.7 1.9 - 3.5 g/dL    A-G Ratio 1.5 g/dL   proBrain Natriuretic Peptide, NT (BNP)    Collection Time: 03/15/25 10:40 AM   Result Value Ref Range    NT-proBNP 134 (H) 0 - 125 pg/mL   Troponins NOW    Collection Time: 03/15/25 10:40 AM   Result Value Ref Range    Troponin T 13 6 - 19 ng/L   ESTIMATED GFR    Collection Time: 03/15/25 10:40 AM   Result Value Ref Range    GFR (CKD-EPI) 80 >60 mL/min/1.73 m 2   EKG    Collection Time: 03/15/25 11:07 AM   Result Value Ref Range    Report       [facility]    Test Date:  2025-03-15  Pt Name:    ASHANTI BETTS                     Department: [facility]  MRN:        [id number]                      Room:       -ROOM 7  Gender:     Female                       Technician: lubna  :        [id number]                   Requested By:ER TRIAGE PROTOCOL  Order #:    [id number]                    Reading MD: BRIANNA HANSON. AMD    Measurements  Intervals                                Axis  Rate:       105                          P:          -24  OK:         159                          QRS:        35  QRSD:       83                           T:          32  QT:         361  QTc:        478    Interpretive Statements  Sinus tachycardia  No previous ECG  available for comparison  Electronically Signed On 03- 11:07:45 PDT by BRIANNA HANSON. AMD     D-DIMER    Collection Time: 03/15/25 12:08 PM   Result Value Ref Range    D-Dimer 0.38 0.00 - 0.50 ug/mL (FEU)   EC-ECHOCARDIOGRAM COMPLETE W/O CONT    Collection Time: 03/19/25 10:48 AM   Result Value Ref Range    Eject.Frac. MOD BP 59.58     Eject.Frac. MOD 4C 57.66     Eject.Frac. MOD 2C 60.68    Comp Metabolic Panel    Collection Time: 03/22/25 10:28 AM   Result Value Ref Range    Sodium 135 135 - 145 mmol/L    Potassium 3.8 3.6 - 5.5 mmol/L    Chloride 100 96 - 112 mmol/L    Co2 24 20 - 33 mmol/L    Anion Gap 11.0 7.0 - 16.0    Glucose 109 (H) 65 - 99 mg/dL    Bun 9 8 - 22 mg/dL    Creatinine 0.65 0.50 - 1.40 mg/dL    Calcium 8.8 8.4 - 10.2 mg/dL    Correct Calcium 8.9 8.5 - 10.5 mg/dL    AST(SGOT) 25 12 - 45 U/L    ALT(SGPT) 23 2 - 50 U/L    Alkaline Phosphatase 62 30 - 99 U/L    Total Bilirubin 0.5 0.1 - 1.5 mg/dL    Albumin 3.9 3.2 - 4.9 g/dL    Total Protein 6.6 6.0 - 8.2 g/dL    Globulin 2.7 1.9 - 3.5 g/dL    A-G Ratio 1.4 g/dL   FASTING STATUS    Collection Time: 03/22/25 10:28 AM   Result Value Ref Range    Fasting Status Fasting    ESTIMATED GFR    Collection Time: 03/22/25 10:28 AM   Result Value Ref Range    GFR (CKD-EPI) 91 >60 mL/min/1.73 m 2   HEMOGLOBIN A1C    Collection Time: 03/29/25  7:37 AM   Result Value Ref Range    Glycohemoglobin 6.2 (H) 4.0 - 5.6 %    Est Avg Glucose 131 mg/dL   VITAMIN B12    Collection Time: 03/29/25  7:37 AM   Result Value Ref Range    Vitamin B12 -True Cobalamin 550 211 - 911 pg/mL   VITAMIN D,25 HYDROXY (DEFICIENCY)    Collection Time: 03/29/25  7:37 AM   Result Value Ref Range    25-Hydroxy   Vitamin D 25 61 30 - 100 ng/mL   TSH WITH REFLEX TO FT4    Collection Time: 03/29/25  7:37 AM   Result Value Ref Range    TSH 3.960 0.380 - 5.330 uIU/mL   Comp Metabolic Panel    Collection Time: 03/29/25  7:37 AM   Result Value Ref Range    Sodium 141 135 - 145 mmol/L     Potassium 3.6 3.6 - 5.5 mmol/L    Chloride 104 96 - 112 mmol/L    Co2 26 20 - 33 mmol/L    Anion Gap 11.0 7.0 - 16.0    Glucose 98 65 - 99 mg/dL    Bun 7 (L) 8 - 22 mg/dL    Creatinine 0.67 0.50 - 1.40 mg/dL    Calcium 8.9 8.5 - 10.5 mg/dL    Correct Calcium 8.7 8.5 - 10.5 mg/dL    AST(SGOT) 24 12 - 45 U/L    ALT(SGPT) 27 2 - 50 U/L    Alkaline Phosphatase 60 30 - 99 U/L    Total Bilirubin 0.6 0.1 - 1.5 mg/dL    Albumin 4.2 3.2 - 4.9 g/dL    Total Protein 7.2 6.0 - 8.2 g/dL    Globulin 3.0 1.9 - 3.5 g/dL    A-G Ratio 1.4 g/dL   CBC WITH DIFFERENTIAL    Collection Time: 03/29/25  7:37 AM   Result Value Ref Range    WBC 4.9 4.8 - 10.8 K/uL    RBC 4.06 (L) 4.20 - 5.40 M/uL    Hemoglobin 12.3 12.0 - 16.0 g/dL    Hematocrit 37.7 37.0 - 47.0 %    MCV 92.9 81.4 - 97.8 fL    MCH 30.3 27.0 - 33.0 pg    MCHC 32.6 32.2 - 35.5 g/dL    RDW 47.5 35.9 - 50.0 fL    Platelet Count 253 164 - 446 K/uL    MPV 9.3 9.0 - 12.9 fL    Neutrophils-Polys 57.30 44.00 - 72.00 %    Lymphocytes 30.50 22.00 - 41.00 %    Monocytes 10.20 0.00 - 13.40 %    Eosinophils 1.00 0.00 - 6.90 %    Basophils 0.60 0.00 - 1.80 %    Immature Granulocytes 0.40 0.00 - 0.90 %    Nucleated RBC 0.00 0.00 - 0.20 /100 WBC    Neutrophils (Absolute) 2.80 1.82 - 7.42 K/uL    Lymphs (Absolute) 1.49 1.00 - 4.80 K/uL    Monos (Absolute) 0.50 0.00 - 0.85 K/uL    Eos (Absolute) 0.05 0.00 - 0.51 K/uL    Baso (Absolute) 0.03 0.00 - 0.12 K/uL    Immature Granulocytes (abs) 0.02 0.00 - 0.11 K/uL    NRBC (Absolute) 0.00 K/uL   Lipid Profile    Collection Time: 03/29/25  7:37 AM   Result Value Ref Range    Cholesterol,Tot 128 100 - 199 mg/dL    Triglycerides 75 0 - 149 mg/dL    HDL 62 >=40 mg/dL    LDL 51 <100 mg/dL   FASTING STATUS    Collection Time: 03/29/25  7:37 AM   Result Value Ref Range    Fasting Status Fasting    ESTIMATED GFR    Collection Time: 03/29/25  7:37 AM   Result Value Ref Range    GFR (CKD-EPI) 90 >60 mL/min/1.73 m 2   MICROALBUMIN CREAT RATIO URINE     Collection Time: 03/29/25  7:48 AM   Result Value Ref Range    Creatinine, Urine 146.00 mg/dL    Microalbumin, Urine Random 1.7 mg/dL    Micro Alb Creat Ratio 12 0 - 30 mg/g   Comp Metabolic Panel    Collection Time: 04/12/25 10:29 AM   Result Value Ref Range    Sodium 138 135 - 145 mmol/L    Potassium 4.0 3.6 - 5.5 mmol/L    Chloride 103 96 - 112 mmol/L    Co2 24 20 - 33 mmol/L    Anion Gap 11.0 7.0 - 16.0    Glucose 92 65 - 99 mg/dL    Bun 8 8 - 22 mg/dL    Creatinine 0.66 0.50 - 1.40 mg/dL    Calcium 9.0 8.5 - 10.5 mg/dL    Correct Calcium 8.9 8.5 - 10.5 mg/dL    AST(SGOT) 26 12 - 45 U/L    ALT(SGPT) 28 2 - 50 U/L    Alkaline Phosphatase 61 30 - 99 U/L    Total Bilirubin 0.6 0.1 - 1.5 mg/dL    Albumin 4.1 3.2 - 4.9 g/dL    Total Protein 6.9 6.0 - 8.2 g/dL    Globulin 2.8 1.9 - 3.5 g/dL    A-G Ratio 1.5 g/dL   FASTING STATUS    Collection Time: 04/12/25 10:29 AM   Result Value Ref Range    Fasting Status Non-Fasting    ESTIMATED GFR    Collection Time: 04/12/25 10:29 AM   Result Value Ref Range    GFR (CKD-EPI) 90 >60 mL/min/1.73 m 2       Reviewed CTA and echo from this year TAE of 3.8      EKG is normal      Assessment & Plan     1. Ascending aorta dilatation (HCC)        2. Benign essential HTN        3. Dyslipidemia            Medical Decision Making: Today's Assessment/Status/Plan:          It was my pleasure to meet with Ms. Khalil.    We addressed the management of hypertension at today's visit. Blood pressure is well controlled.  We specifically assessed the labs on hypertension treatment    We addressed the management of dyslipidemia at today's visit. She is on appropriate lipid lowering medication.      Thoracic Aaortic Enlrgement : would check echo or CT in 2 years and if stable space out to every 2-3 years    Ms. Khalil does not require regular cardiology follow up, I have advised her to call our office or e-mail using my [employer] if needed.    It is my pleasure to participate in the care of Ms. Khalil.  Please  do not hesitate to contact me with questions or concerns.    Oral Moscoso MD PhD [facility]  Cardiologist [facility]    Please note that this dictation was created using voice recognition software. There may be errors I did not discover before finalizing the note.                          [1]   Past Medical History:  Diagnosis Date    Asthma     Hypertension    [2]   Past Surgical History:  Procedure Laterality Date    OTHER  2016    hemithyroidectomy, left    APPENDECTOMY  2008    OTHER      anal fissure repair   [3] No Known Allergies  [4]   Outpatient Encounter Medications as of 5/27/2025   Medication Sig Dispense Refill    fluticasone-salmeterol (ADVAIR) 100-50 MCG/ACT AEROSOL POWDER, BREATH ACTIVATED Inhale 1 Puff every 12 hours. 180 Each 3    benzonatate (TESSALON) 100 MG Cap Take 1 Capsule by mouth 3 times a day as needed for Cough. 60 Capsule 0    levothyroxine (SYNTHROID) 50 MCG Tab Take orally. 1 tab daily from Monday to Friday. 2 tabs daily on Sat and Sun. 110 Tablet 3    losartan (COZAAR) 25 MG Tab Take 1 Tablet by mouth every evening. 90 Tablet 3    metoprolol SR (TOPROL XL) 25 MG TABLET SR 24 HR Take 1 Tablet by mouth every day. 90 Tablet 3    ofloxacin (OCUFLOX) 0.3 % Solution Administer 1 Drop into the left eye 2 times a day. 10 mL 3    rosuvastatin (CRESTOR) 5 MG Tab Take 1 Tablet by mouth every evening. 90 Tablet 3    PROLIA 60 MG/ML Solution Prefilled Syringe injection       vitamin D2, Ergocalciferol, (DRISDOL) 1.25 MG (29515 UT) Cap capsule TAKE 1 CAPSULE BY MOUTH ONE TIME PER WEEK 12 Capsule 3    Calcium-Magnesium-Vitamin D (CALCIUM 1200+D3 PO) Take  by mouth.      sodium chloride (SALT) 1 GM Tab Take 1 Tablet by mouth 2 times a day. (Patient not taking: Reported on 5/27/2025) 180 Tablet 1    [DISCONTINUED] aspirin 81 MG EC tablet Take 81 mg by mouth every day.       No facility-administered encounter medications on file as of 5/27/2025.

## 2025-07-02 ENCOUNTER — TELEPHONE (OUTPATIENT)
Dept: MEDICAL GROUP | Facility: MEDICAL CENTER | Age: 75
End: 2025-07-02
Payer: MEDICARE

## 2025-07-02 NOTE — TELEPHONE ENCOUNTER
----- Message from Regina WOODWARD sent at 7/2/2025 10:34 AM PDT -----  Regarding: Prolia  Good morning Dr. Calhoun,  This [facility] an [facility] her Prolia and was wondering if you could send us an order for that & do some kind of encounter discussing it so the insurance to approve it? Her old provider is no longer with [facility].       Thank you    Eliza

## 2025-07-03 ENCOUNTER — APPOINTMENT (OUTPATIENT)
Dept: MEDICAL GROUP | Age: 75
End: 2025-07-03
Payer: MEDICARE

## 2025-07-03 VITALS
RESPIRATION RATE: 16 BRPM | HEART RATE: 84 BPM | BODY MASS INDEX: 27.09 KG/M2 | TEMPERATURE: 98.5 F | WEIGHT: 138 LBS | OXYGEN SATURATION: 97 % | SYSTOLIC BLOOD PRESSURE: 116 MMHG | DIASTOLIC BLOOD PRESSURE: 58 MMHG | HEIGHT: 60 IN

## 2025-07-03 DIAGNOSIS — E06.3 HYPOTHYROIDISM DUE TO HASHIMOTO'S THYROIDITIS: ICD-10-CM

## 2025-07-03 DIAGNOSIS — E78.5 DYSLIPIDEMIA: ICD-10-CM

## 2025-07-03 DIAGNOSIS — E53.8 VITAMIN B12 DEFICIENCY: ICD-10-CM

## 2025-07-03 DIAGNOSIS — R05.2 SUBACUTE COUGH: ICD-10-CM

## 2025-07-03 DIAGNOSIS — I10 BENIGN ESSENTIAL HTN: ICD-10-CM

## 2025-07-03 DIAGNOSIS — E55.9 VITAMIN D DEFICIENCY: ICD-10-CM

## 2025-07-03 DIAGNOSIS — Z00.00 MEDICARE ANNUAL WELLNESS VISIT, SUBSEQUENT: ICD-10-CM

## 2025-07-03 DIAGNOSIS — H91.93 BILATERAL HEARING LOSS, UNSPECIFIED HEARING LOSS TYPE: Primary | ICD-10-CM

## 2025-07-03 DIAGNOSIS — R73.9 HYPERGLYCEMIA: ICD-10-CM

## 2025-07-03 PROCEDURE — 3078F DIAST BP <80 MM HG: CPT | Performed by: INTERNAL MEDICINE

## 2025-07-03 PROCEDURE — 3074F SYST BP LT 130 MM HG: CPT | Performed by: INTERNAL MEDICINE

## 2025-07-03 PROCEDURE — G0439 PPPS, SUBSEQ VISIT: HCPCS | Performed by: INTERNAL MEDICINE

## 2025-07-03 PROCEDURE — 99214 OFFICE O/P EST MOD 30 MIN: CPT | Mod: 25 | Performed by: INTERNAL MEDICINE

## 2025-07-03 RX ORDER — DEXTROMETHORPHAN HYDROBROMIDE AND PROMETHAZINE HYDROCHLORIDE 15; 6.25 MG/5ML; MG/5ML
5 SYRUP ORAL EVERY 6 HOURS PRN
Qty: 118 ML | Refills: 0 | Status: SHIPPED | OUTPATIENT
Start: 2025-07-03

## 2025-07-03 RX ORDER — ROSUVASTATIN CALCIUM 5 MG/1
5 TABLET, COATED ORAL EVERY EVENING
Qty: 90 TABLET | Refills: 3 | Status: SHIPPED | OUTPATIENT
Start: 2025-07-03

## 2025-07-03 RX ORDER — LEVOTHYROXINE SODIUM 50 UG/1
TABLET ORAL
Qty: 110 TABLET | Refills: 3 | Status: SHIPPED | OUTPATIENT
Start: 2025-07-03

## 2025-07-03 RX ORDER — LOSARTAN POTASSIUM 25 MG/1
25 TABLET ORAL EVERY EVENING
Qty: 90 TABLET | Refills: 3 | Status: SHIPPED | OUTPATIENT
Start: 2025-07-03

## 2025-07-03 RX ORDER — METOPROLOL SUCCINATE 25 MG/1
25 TABLET, EXTENDED RELEASE ORAL DAILY
Qty: 90 TABLET | Refills: 3 | Status: SHIPPED | OUTPATIENT
Start: 2025-07-03

## 2025-07-03 RX ORDER — CODEINE PHOSPHATE AND GUAIFENESIN 10; 100 MG/5ML; MG/5ML
5 SOLUTION ORAL EVERY 4 HOURS PRN
Qty: 420 ML | Refills: 0 | Status: SHIPPED | OUTPATIENT
Start: 2025-07-03 | End: 2025-07-03

## 2025-07-03 ASSESSMENT — ENCOUNTER SYMPTOMS: GENERAL WELL-BEING: FAIR

## 2025-07-03 ASSESSMENT — PATIENT HEALTH QUESTIONNAIRE - PHQ9: CLINICAL INTERPRETATION OF PHQ2 SCORE: 0

## 2025-07-03 ASSESSMENT — ACTIVITIES OF DAILY LIVING (ADL): BATHING_REQUIRES_ASSISTANCE: 0

## 2025-07-03 ASSESSMENT — FIBROSIS 4 INDEX: FIB4 SCORE: 1.5

## 2025-07-03 NOTE — PROGRESS NOTES
Chief Complaint   Patient presents with    Annual Wellness Visit     Verbal consent was acquired by the patient to use LVL6 ambient listening note generation during this visit Yes     History of Present Illness  Isha is a pleasant 75-year-old female presenting for a Medicare wellness visit, cough, hypothyroidism, hyponatremia, hearing loss, and osteoporosis.    She maintains her physical activity by walking indoors for about 20 minutes daily, avoiding outdoor activities due to cold weather. She does not engage in social activities such as Zoroastrianism or library visits or volunteering. She reports no mental health issues, including anxiety or depression. Her memory is generally good, although she occasionally forgets minor details. She does not drive and has not established advanced directives. She has received the RSV vaccine and gets annual influenza shots. Her last COVID-19 vaccine was administered in either May or June 2025. She experiences occasional hearing difficulties and is interested in getting evaluated. She does not regularly visit the dentist due to transportation difficulties but maintains oral hygiene by brushing her teeth daily and using mouthwash.     She had a mammogram in September 2024, which showed dense breasts but no abnormalities. She is currently on Prolia for osteoporosis and has previously taken alendronate.    She has been experiencing a cough for approximately 2 weeks, which is gradually improving. The cough is productive, with occasional congestion, but she does not expectorate. She has tested negative for COVID-19 and influenza. She reports no fever. She consumes about 1.5 liters of water daily. She has tried various over-the-counter medications, including DayQuil, NyQuil, Mucinex, and Robitussin, as well as benzonatate, which she has at home.    Patient Active Problem List    Diagnosis Date Noted    Ascending aorta dilatation (HCC) 03/19/2025    Hyponatremia 02/26/2025     Dyslipidemia 10/14/2022    Hypothyroidism due to Hashimoto's thyroiditis 10/14/2022    Benign essential HTN 10/14/2022    Moderate persistent asthma without complication 10/14/2022    History of pulmonary embolus (PE) 10/14/2022    Other insomnia 10/14/2022    Multinodular goiter sp hemithyroidectomy 10/14/2022    Age-related osteoporosis without current pathological fracture 10/14/2022    History of GI bleed from erosion 10/14/2022    Corneal abnormality, left 10/14/2022    Legal blindness of right eye, as defined in [place] 10/14/2022    History of glaucoma, right 10/14/2022    Other age-related cataract, left 10/14/2022     Current Medications[1]       Current supplements as per medication list.     Allergies: Patient has no known allergies.    Current social contact/activities: as above      She  reports that she has never smoked. She has never used smokeless tobacco. She reports that she does not drink alcohol and does not use drugs.  Counseling given: Not Answered    ROS:    Gait: Uses no assistive device  Ostomy: No  Other tubes: No  Amputations: No  Chronic oxygen use: No  Last eye exam: 2024  Wears hearing aids: No   : Reports urinary leakage during the last 6 months that has somewhat interfered with their daily activities or sleep.    Screening:    Depression Screening  Little interest or pleasure in doing things?  0 - not at all  Feeling down, depressed , or hopeless? 0 - not at all  Patient Health Questionnaire Score: 0     If depressive symptoms identified deferred to follow up visit unless specifically addressed in assessment and plan.    Interpretation of PHQ-9 Total Score   Score Severity   1-4 No Depression   5-9 Mild Depression   10-14 Moderate Depression   15-19 Moderately Severe Depression   20-27 Severe Depression    Screening for Cognitive Impairment  Do you or any of your friends or family members have any concern about your memory? Yes  Three Minute Recall (Tyra,  Kitchen, Baby) 2/3    Tommie clock face with all 12 numbers and set the hands to show 10 minutes past 11.  Yes    Cognitive concerns identified deferred for follow up unless specifically addressed in assessment and plan.    Fall Risk Assessment  Has the patient had two or more falls in the last year or any fall with injury in the last year?  No    Safety Assessment  Do you always wear your seatbelt?  Yes  Any changes to home needed to function safely? No  Difficulty hearing.  Yes  Patient counseled about all safety risks that were identified.    Functional Assessment ADLs  Are there any barriers preventing you from cooking for yourself or meeting nutritional needs?  Yes. Eye sight  Are there any barriers preventing you from driving safely or obtaining transportation?  No.    Are there any barriers preventing you from using a telephone or calling for help?  No    Are there any barriers preventing you from shopping?  No.    Are there any barriers preventing you from taking care of your own finances?  No    Are there any barriers preventing you from managing your medications?  No    Are there any barriers preventing you from showering, bathing or dressing yourself? No    Are there any barriers preventing you from doing housework or laundry? No  Are there any barriers preventing you from using the toilet?No  Are you currently engaging in any exercise or physical activity?  Yes. Walking    Self-Assessment of Health  What is your perception of your health? Fair    Do you sleep more than six hours a night? No    In the past 7 days, how much did pain keep you from doing your normal work? Some    Do you spend quality time with family or friends (virtually or in person)? Yes    Do you usually eat a heart healthy diet that constists of a variety of fruits, vegetables, whole grains and fiber? Yes    Do you eat foods high in fat and/or Fast Food more than three times per week? No    How concerned are you that your medical  conditions are not being well managed? Not at all    Are you worried that in the next 2 months, you may not have stable housing that you own, rent, or stay in as part of a household? No      Advance Care Planning  Do you have an Advance Directive, Living Will, Durable Power of , or POLST? No               Health Maintenance Summary            Upcoming       Influenza Vaccine (1) Next due on 9/1/2025 09/14/2024  Outside Immunization: Influenza, High Dose    09/10/2023  Imm Admin: Influenza Vaccine, Quadrivalent, Adjuvanted (Pf)              Annual Wellness Visit (Yearly) Next due on 7/3/2026      07/03/2025  Level of Service: NM ANNUAL WELLNESS VISIT-INCLUDES PPPS SUBSEQUE*    07/03/2025  Visit Dx: Medicare annual wellness visit, subsequent    03/13/2024  Visit Dx: Encounter for Medicare annual wellness exam              Bone Density Scan (Every 5 Years) Next due on 1/20/2030 01/20/2025  DS-BONE DENSITY STUDY (DEXA)    11/11/2022  DS-BONE DENSITY STUDY (DEXA)              IMM DTaP/Tdap/Td Vaccine (2 - Td or Tdap) Next due on 12/7/2034 12/07/2024  Outside Immunization: Tdap                      Completed or No Longer Recommended       Zoster (Shingles) Vaccines (Series Information) Completed      11/30/2024  Outside Immunization: Zoster Cameron (Shingrix)    09/14/2024  Outside Immunization: Zoster Cameron (Shingrix)              COVID-19 Vaccine (Series Information) Completed      06/01/2025  Imm Admin: COVID-19, mRNA, LNP-S, PF, felisa-sucrose, 30 mcg/0.3 mL    09/29/2024  Imm Admin: COVID-19, mRNA, LNP-S, PF, felisa-sucrose, 30 mcg/0.3 mL    06/01/2024  Imm Admin: Comirnaty (Covid-19 Vaccine, Mrna, [id number] Formula)    10/21/2023  Imm Admin: Comirnaty (Covid-19 Vaccine, Mrna, [id number] Formula)    09/28/2022  Imm Admin: PFIZER BIVALENT SARS-COV-2 VACCINE (12+)     Only the first 5 history entries have been loaded, but more history exists.            Hepatitis C Screening  Completed      10/22/2022   "Hepatitis C Antibody component of HEP C VIRUS ANTIBODY              Pneumococcal Vaccine: 50+ Years (Series Information) Completed      09/10/2023  Imm Admin: Pneumococcal Conjugate Vaccine (PCV20)              Hepatitis A Vaccine (Hep A) (Series Information) Aged Out      No completion history exists for this topic.              Hepatitis B Vaccine (Hep B) (Series Information) Aged Out     No completion history exists for this topic.              HPV Vaccines (Series Information) Aged Out     No completion history exists for this topic.              Polio Vaccine (Inactivated Polio) (Series Information) Aged Out     No completion history exists for this topic.              Meningococcal Immunization (Series Information) Aged Out     No completion history exists for this topic.              Meningococcal B Vaccine (Series Information) Aged Out     No completion history exists for this topic.              Mammogram  Discontinued        Frequency changed to Never automatically (Topic No Longer Applies)    09/24/2024  MA-SCREENING MAMMO BILAT W/TOMOSYNTHESIS W/CAD              Colorectal Cancer Screening  Discontinued        Frequency changed to Never automatically (Topic No Longer Applies)    01/08/2024  COLOGUARD RESULT component of LAB COLOGUARD® COLON CANCER SCREEN    01/08/2024  COLOGUARD COLON CANCER SCREENING                            Patient Care Team:  Linda Calhoun M.D. as PCP - General (Internal Medicine)    Social History[2]  Family History   Problem Relation Age of Onset    No Known Problems Mother     No Known Problems Father      She  has a past medical history of Asthma and Hypertension.   Past Surgical History[3]    Exam:   /58 (BP Location: Left arm, Patient Position: Sitting, BP Cuff Size: Adult)   Pulse 84   Temp 36.9 °C (98.5 °F) (Temporal)   Resp 16   Ht 1.52 m (4' 11.84\")   Wt 62.6 kg (138 lb)   SpO2 97%  Body mass index is 27.09 kg/m².  Physical Exam  Constitutional:     "   Appearance: Normal appearance.   HENT:      Head: Normocephalic and atraumatic.   Cardiovascular:      Pulses: Normal pulses.      Heart sounds: Normal heart sounds. No murmur heard.  Pulmonary:      Effort: Pulmonary effort is normal. No respiratory distress.      Breath sounds: Normal breath sounds.   Neurological:      Mental Status: She is alert and oriented to person, place, and time.   Psychiatric:         Mood and Affect: Mood normal.         Behavior: Behavior normal.         Thought Content: Thought content normal.         Judgment: Judgment normal.       Hearing poor.    Dentition fair  Alert, oriented in no acute distress.  Eye contact is good, speech goal directed, affect calm    Assessment & Plan  1. Medicare wellness visit.  - Advised to engage in physical exercise for at least 30 minutes daily.  - Encouraged to establish advanced directives and a healthcare power of .  - Recommended regular dental check-ups, ideally every six months, and daily flossing.    - Continue yearly mammograms     2. Cough.  - Experiencing a cough for approximately 2 weeks, which is slowly getting better.   - An x-ray of the lungs was ordered to rule out any serious conditions.  - Prescription for codeine syrup was provided to manage the cough, continue inhalers, Tessalon as needed  - Advised to use a humidifier at home to maintain air humidity and alleviate cough symptoms.     3. Hypothyroidism.  - Currently taking levothyroxine 1 tablet Monday through Friday and 2 tablets on Saturday and Sunday.  - Refill for levothyroxine was provided.    4. Hyponatremia.  - Sodium levels have normalized.    5. Hearing loss.  - Referral to audiology was made for further evaluation of hearing loss.    6. Osteoporosis.  - Bone density is improving with Prolia treatment.  - Will continue with Prolia injections every six months.    Services suggested: No services needed at this time  Health Care Screening: Age-appropriate preventive  services recommended by USPTF and ACIP covered by Medicare were discussed today. Services ordered if indicated and agreed upon by the patient.  Referrals offered: Community-based lifestyle interventions to reduce health risks and promote self-management and wellness, fall prevention, nutrition, physical activity, tobacco-use cessation, weight loss, and mental health services as per orders if indicated.    Discussion today about general wellness and lifestyle habits:    Prevent falls and reduce trip hazards; Cautioned about securing or removing rugs.  Have a working fire alarm and carbon monoxide detector;   Engage in regular physical activity and social activities       Please note that this dictation was created using voice recognition software. I have made every reasonable attempt to correct obvious errors, but I expect that there are errors of grammar and possibly content that I did not discover before finalizing the note.    Follow-up  - The patient will follow up in 1 year for annual physical.       [1]   Current Outpatient Medications   Medication Sig Dispense Refill    levothyroxine (SYNTHROID) 50 MCG Tab Take orally. 1 tab daily from Monday to Friday. 2 tabs daily on Sat and Sun. 110 Tablet 3    losartan (COZAAR) 25 MG Tab Take 1 Tablet by mouth every evening. 90 Tablet 3    metoprolol SR (TOPROL XL) 25 MG TABLET SR 24 HR Take 1 Tablet by mouth every day. 90 Tablet 3    rosuvastatin (CRESTOR) 5 MG Tab Take 1 Tablet by mouth every evening. 90 Tablet 3    fluticasone-salmeterol (ADVAIR) 100-50 MCG/ACT AEROSOL POWDER, BREATH ACTIVATED Inhale 1 Puff every 12 hours. 180 Each 3    benzonatate (TESSALON) 100 MG Cap Take 1 Capsule by mouth 3 times a day as needed for Cough. 60 Capsule 0    ofloxacin (OCUFLOX) 0.3 % Solution Administer 1 Drop into the left eye 2 times a day. 10 mL 3    PROLIA 60 MG/ML Solution Prefilled Syringe injection       vitamin D2, Ergocalciferol, (DRISDOL) 1.25 MG (19834 UT) Cap capsule TAKE  1 CAPSULE BY MOUTH ONE TIME PER WEEK 12 Capsule 3    Calcium-Magnesium-Vitamin D (CALCIUM 1200+D3 PO) Take  by mouth.       No current facility-administered medications for this visit.   [2]   Social History  Tobacco Use    Smoking status: Never    Smokeless tobacco: Never   Vaping Use    Vaping status: Never Used   Substance Use Topics    Alcohol use: Never    Drug use: Never   [3]   Past Surgical History:  Procedure Laterality Date    OTHER  2016    hemithyroidectomy, left    APPENDECTOMY  2008    OTHER      anal fissure repair

## 2025-07-13 DIAGNOSIS — J45.40 MODERATE PERSISTENT ASTHMA WITHOUT COMPLICATION: ICD-10-CM

## 2025-07-14 RX ORDER — FLUTICASONE PROPIONATE AND SALMETEROL XINAFOATE 45; 21 UG/1; UG/1
2 AEROSOL, METERED RESPIRATORY (INHALATION) 2 TIMES DAILY
Qty: 36 G | Refills: 5 | Status: SHIPPED | OUTPATIENT
Start: 2025-07-14 | End: 2025-07-21

## 2025-07-15 DIAGNOSIS — J45.40 MODERATE PERSISTENT ASTHMA WITHOUT COMPLICATION: ICD-10-CM

## 2025-07-16 NOTE — TELEPHONE ENCOUNTER
Received request via: Pharmacy    Was the patient seen in the last year in this department? Yes    Does the patient have an active prescription (recently filled or refills available) for medication(s) requested? No    Pharmacy Name: [facility]/pharmacy #[id number] - [place], [place] - [address]     Does the patient have custodial Plus and need 100-day supply? (This applies to ALL medications) Patient does not have SCP

## 2025-07-21 RX ORDER — FLUTICASONE PROPIONATE AND SALMETEROL XINAFOATE 45; 21 UG/1; UG/1
2 AEROSOL, METERED RESPIRATORY (INHALATION) 2 TIMES DAILY
Qty: 36 G | Refills: 5 | Status: SHIPPED
Start: 2025-07-21

## 2025-07-26 ENCOUNTER — OUTPATIENT INFUSION SERVICES (OUTPATIENT)
Dept: ONCOLOGY | Facility: MEDICAL CENTER | Age: 75
End: 2025-07-26
Attending: INTERNAL MEDICINE
Payer: MEDICARE

## 2025-07-26 VITALS
WEIGHT: 139.33 LBS | HEIGHT: 61 IN | BODY MASS INDEX: 26.31 KG/M2 | HEART RATE: 99 BPM | DIASTOLIC BLOOD PRESSURE: 71 MMHG | TEMPERATURE: 97.9 F | RESPIRATION RATE: 17 BRPM | OXYGEN SATURATION: 95 % | SYSTOLIC BLOOD PRESSURE: 131 MMHG

## 2025-07-26 DIAGNOSIS — M81.0 AGE-RELATED OSTEOPOROSIS WITHOUT CURRENT PATHOLOGICAL FRACTURE: Primary | ICD-10-CM

## 2025-07-26 LAB
CA-I BLD ISE-SCNC: 1.18 MMOL/L (ref 1.1–1.3)
CREAT BLD-MCNC: 0.8 MG/DL (ref 0.5–1.4)

## 2025-07-26 PROCEDURE — 36415 COLL VENOUS BLD VENIPUNCTURE: CPT

## 2025-07-26 PROCEDURE — 96372 THER/PROPH/DIAG INJ SC/IM: CPT

## 2025-07-26 PROCEDURE — 82565 ASSAY OF CREATININE: CPT

## 2025-07-26 PROCEDURE — 82330 ASSAY OF CALCIUM: CPT

## 2025-07-26 PROCEDURE — 700111 HCHG RX REV CODE 636 W/ 250 OVERRIDE (IP): Mod: JZ,UD,TB | Performed by: INTERNAL MEDICINE

## 2025-07-26 RX ORDER — EPINEPHRINE 1 MG/ML(1)
0.5 AMPUL (ML) INJECTION PRN
OUTPATIENT
Start: 2026-01-17

## 2025-07-26 RX ORDER — METHYLPREDNISOLONE SODIUM SUCCINATE 125 MG/2ML
125 INJECTION INTRAMUSCULAR; INTRAVENOUS PRN
OUTPATIENT
Start: 2026-01-17

## 2025-07-26 RX ORDER — DIPHENHYDRAMINE HYDROCHLORIDE 50 MG/ML
50 INJECTION, SOLUTION INTRAMUSCULAR; INTRAVENOUS PRN
OUTPATIENT
Start: 2026-01-17

## 2025-07-26 RX ADMIN — DENOSUMAB 60 MG: 60 INJECTION SUBCUTANEOUS at 15:11

## 2025-07-26 ASSESSMENT — FIBROSIS 4 INDEX: FIB4 SCORE: 1.5

## 2025-07-26 NOTE — PROGRESS NOTES
Ms Khalil is here today for prolia injection.     She is accompanied by her daughter, Jessica who is translating for her.     Pt denies any loose or broken teeth, no jaw pain and no procedures.   Takes calcium and vitamin d.     Venipuncture to her right arm.   Istat labs are within parameters for treatment.     Prolia given sq to the back of her right arm. Tolerated well.     Pt discharged in stable condition.   Scheduling emailed.